# Patient Record
Sex: FEMALE | Race: WHITE | NOT HISPANIC OR LATINO
[De-identification: names, ages, dates, MRNs, and addresses within clinical notes are randomized per-mention and may not be internally consistent; named-entity substitution may affect disease eponyms.]

---

## 2017-02-01 ENCOUNTER — APPOINTMENT (OUTPATIENT)
Dept: INTERNAL MEDICINE | Facility: CLINIC | Age: 67
End: 2017-02-01

## 2017-02-01 VITALS
OXYGEN SATURATION: 98 % | DIASTOLIC BLOOD PRESSURE: 80 MMHG | HEIGHT: 61.5 IN | SYSTOLIC BLOOD PRESSURE: 146 MMHG | WEIGHT: 178 LBS | HEART RATE: 88 BPM | BODY MASS INDEX: 33.18 KG/M2

## 2017-02-01 VITALS — DIASTOLIC BLOOD PRESSURE: 88 MMHG | SYSTOLIC BLOOD PRESSURE: 140 MMHG

## 2017-02-01 DIAGNOSIS — R25.2 CRAMP AND SPASM: ICD-10-CM

## 2017-02-05 LAB
25(OH)D3 SERPL-MCNC: 45.6 NG/ML
ALBUMIN SERPL ELPH-MCNC: 4.7 G/DL
ALP BLD-CCNC: 53 U/L
ALT SERPL-CCNC: 31 U/L
ANION GAP SERPL CALC-SCNC: 17 MMOL/L
AST SERPL-CCNC: 26 U/L
BASOPHILS # BLD AUTO: 0.06 K/UL
BASOPHILS NFR BLD AUTO: 0.8 %
BILIRUB SERPL-MCNC: 0.8 MG/DL
BUN SERPL-MCNC: 13 MG/DL
CALCIUM SERPL-MCNC: 9.9 MG/DL
CHLORIDE SERPL-SCNC: 99 MMOL/L
CHOLEST SERPL-MCNC: 209 MG/DL
CHOLEST/HDLC SERPL: 2.1 RATIO
CO2 SERPL-SCNC: 25 MMOL/L
CREAT SERPL-MCNC: 0.76 MG/DL
EOSINOPHIL # BLD AUTO: 0.11 K/UL
EOSINOPHIL NFR BLD AUTO: 1.5 %
GLUCOSE SERPL-MCNC: 99 MG/DL
HBA1C MFR BLD HPLC: 6.1 %
HCT VFR BLD CALC: 45.4 %
HDLC SERPL-MCNC: 101 MG/DL
HGB BLD-MCNC: 14.6 G/DL
IMM GRANULOCYTES NFR BLD AUTO: 0.1 %
LDLC SERPL CALC-MCNC: 93 MG/DL
LYMPHOCYTES # BLD AUTO: 1.82 K/UL
LYMPHOCYTES NFR BLD AUTO: 25.6 %
MAGNESIUM SERPL-MCNC: 2 MG/DL
MAN DIFF?: NORMAL
MCHC RBC-ENTMCNC: 28.9 PG
MCHC RBC-ENTMCNC: 32.2 GM/DL
MCV RBC AUTO: 89.9 FL
MONOCYTES # BLD AUTO: 0.46 K/UL
MONOCYTES NFR BLD AUTO: 6.5 %
NEUTROPHILS # BLD AUTO: 4.66 K/UL
NEUTROPHILS NFR BLD AUTO: 65.5 %
PLATELET # BLD AUTO: 292 K/UL
POTASSIUM SERPL-SCNC: 4 MMOL/L
PROT SERPL-MCNC: 7.5 G/DL
RBC # BLD: 5.05 M/UL
RBC # FLD: 14.5 %
SODIUM SERPL-SCNC: 141 MMOL/L
TRIGL SERPL-MCNC: 74 MG/DL
TSH SERPL-ACNC: 3.44 UIU/ML
WBC # FLD AUTO: 7.12 K/UL

## 2017-03-17 ENCOUNTER — APPOINTMENT (OUTPATIENT)
Dept: ULTRASOUND IMAGING | Facility: IMAGING CENTER | Age: 67
End: 2017-03-17

## 2017-03-17 ENCOUNTER — OUTPATIENT (OUTPATIENT)
Dept: OUTPATIENT SERVICES | Facility: HOSPITAL | Age: 67
LOS: 1 days | End: 2017-03-17
Payer: COMMERCIAL

## 2017-03-17 DIAGNOSIS — Z00.8 ENCOUNTER FOR OTHER GENERAL EXAMINATION: ICD-10-CM

## 2017-03-17 DIAGNOSIS — Z98.89 OTHER SPECIFIED POSTPROCEDURAL STATES: Chronic | ICD-10-CM

## 2017-03-17 PROCEDURE — 76642 ULTRASOUND BREAST LIMITED: CPT

## 2017-06-29 ENCOUNTER — APPOINTMENT (OUTPATIENT)
Dept: OBGYN | Facility: CLINIC | Age: 67
End: 2017-06-29

## 2017-06-29 VITALS
HEIGHT: 61 IN | WEIGHT: 180 LBS | SYSTOLIC BLOOD PRESSURE: 148 MMHG | BODY MASS INDEX: 33.99 KG/M2 | HEART RATE: 84 BPM | DIASTOLIC BLOOD PRESSURE: 74 MMHG

## 2017-09-22 ENCOUNTER — APPOINTMENT (OUTPATIENT)
Dept: ULTRASOUND IMAGING | Facility: IMAGING CENTER | Age: 67
End: 2017-09-22
Payer: COMMERCIAL

## 2017-09-22 ENCOUNTER — APPOINTMENT (OUTPATIENT)
Dept: MAMMOGRAPHY | Facility: IMAGING CENTER | Age: 67
End: 2017-09-22
Payer: COMMERCIAL

## 2017-09-22 ENCOUNTER — OUTPATIENT (OUTPATIENT)
Dept: OUTPATIENT SERVICES | Facility: HOSPITAL | Age: 67
LOS: 1 days | End: 2017-09-22
Payer: COMMERCIAL

## 2017-09-22 ENCOUNTER — APPOINTMENT (OUTPATIENT)
Dept: ULTRASOUND IMAGING | Facility: IMAGING CENTER | Age: 67
End: 2017-09-22

## 2017-09-22 ENCOUNTER — APPOINTMENT (OUTPATIENT)
Dept: MAMMOGRAPHY | Facility: IMAGING CENTER | Age: 67
End: 2017-09-22

## 2017-09-22 DIAGNOSIS — Z98.89 OTHER SPECIFIED POSTPROCEDURAL STATES: Chronic | ICD-10-CM

## 2017-09-22 DIAGNOSIS — N95.2 POSTMENOPAUSAL ATROPHIC VAGINITIS: ICD-10-CM

## 2017-09-22 PROCEDURE — G0202: CPT | Mod: 26

## 2017-09-22 PROCEDURE — 76641 ULTRASOUND BREAST COMPLETE: CPT | Mod: 26,50

## 2017-09-22 PROCEDURE — 77063 BREAST TOMOSYNTHESIS BI: CPT | Mod: 26

## 2017-09-22 PROCEDURE — 76641 ULTRASOUND BREAST COMPLETE: CPT

## 2017-09-22 PROCEDURE — 77063 BREAST TOMOSYNTHESIS BI: CPT

## 2017-09-22 PROCEDURE — 77067 SCR MAMMO BI INCL CAD: CPT

## 2017-09-27 ENCOUNTER — TRANSCRIPTION ENCOUNTER (OUTPATIENT)
Age: 67
End: 2017-09-27

## 2017-12-22 ENCOUNTER — APPOINTMENT (OUTPATIENT)
Dept: GASTROENTEROLOGY | Facility: CLINIC | Age: 67
End: 2017-12-22
Payer: MEDICARE

## 2017-12-22 VITALS
BODY MASS INDEX: 34.39 KG/M2 | DIASTOLIC BLOOD PRESSURE: 80 MMHG | WEIGHT: 182 LBS | HEART RATE: 82 BPM | OXYGEN SATURATION: 98 % | SYSTOLIC BLOOD PRESSURE: 130 MMHG | TEMPERATURE: 98.2 F

## 2017-12-22 PROCEDURE — 99214 OFFICE O/P EST MOD 30 MIN: CPT

## 2018-01-25 ENCOUNTER — APPOINTMENT (OUTPATIENT)
Dept: INTERNAL MEDICINE | Facility: CLINIC | Age: 68
End: 2018-01-25
Payer: COMMERCIAL

## 2018-01-25 ENCOUNTER — OUTPATIENT (OUTPATIENT)
Dept: OUTPATIENT SERVICES | Facility: HOSPITAL | Age: 68
LOS: 1 days | Discharge: HOME | End: 2018-01-25
Payer: MEDICARE

## 2018-01-25 ENCOUNTER — RESULT REVIEW (OUTPATIENT)
Age: 68
End: 2018-01-25

## 2018-01-25 ENCOUNTER — APPOINTMENT (OUTPATIENT)
Dept: GASTROENTEROLOGY | Facility: HOSPITAL | Age: 68
End: 2018-01-25

## 2018-01-25 DIAGNOSIS — Z98.89 OTHER SPECIFIED POSTPROCEDURAL STATES: Chronic | ICD-10-CM

## 2018-01-25 DIAGNOSIS — K21.9 GASTRO-ESOPHAGEAL REFLUX DISEASE WITHOUT ESOPHAGITIS: ICD-10-CM

## 2018-01-25 PROCEDURE — G0008: CPT

## 2018-01-25 PROCEDURE — 90686 IIV4 VACC NO PRSV 0.5 ML IM: CPT

## 2018-01-25 PROCEDURE — 88305 TISSUE EXAM BY PATHOLOGIST: CPT | Mod: 26

## 2018-01-25 PROCEDURE — 88312 SPECIAL STAINS GROUP 1: CPT | Mod: 26

## 2018-01-25 PROCEDURE — 43239 EGD BIOPSY SINGLE/MULTIPLE: CPT

## 2018-01-26 LAB — SURGICAL PATHOLOGY STUDY: SIGNIFICANT CHANGE UP

## 2018-02-02 ENCOUNTER — APPOINTMENT (OUTPATIENT)
Dept: INTERNAL MEDICINE | Facility: CLINIC | Age: 68
End: 2018-02-02
Payer: MEDICARE

## 2018-02-02 ENCOUNTER — NON-APPOINTMENT (OUTPATIENT)
Age: 68
End: 2018-02-02

## 2018-02-02 VITALS
DIASTOLIC BLOOD PRESSURE: 80 MMHG | OXYGEN SATURATION: 98 % | BODY MASS INDEX: 33.99 KG/M2 | WEIGHT: 180 LBS | HEIGHT: 61 IN | SYSTOLIC BLOOD PRESSURE: 146 MMHG | HEART RATE: 79 BPM

## 2018-02-02 DIAGNOSIS — N60.19 DIFFUSE CYSTIC MASTOPATHY OF UNSPECIFIED BREAST: ICD-10-CM

## 2018-02-02 DIAGNOSIS — Z80.8 FAMILY HISTORY OF MALIGNANT NEOPLASM OF OTHER ORGANS OR SYSTEMS: ICD-10-CM

## 2018-02-02 DIAGNOSIS — J30.9 ALLERGIC RHINITIS, UNSPECIFIED: ICD-10-CM

## 2018-02-02 PROCEDURE — 99215 OFFICE O/P EST HI 40 MIN: CPT | Mod: 25

## 2018-02-02 PROCEDURE — G0009: CPT

## 2018-02-02 PROCEDURE — 90670 PCV13 VACCINE IM: CPT

## 2018-02-03 VITALS — DIASTOLIC BLOOD PRESSURE: 82 MMHG | SYSTOLIC BLOOD PRESSURE: 122 MMHG

## 2018-02-03 PROBLEM — J30.9 ALLERGIC RHINITIS: Status: ACTIVE | Noted: 2018-02-03

## 2018-02-03 PROBLEM — N60.19 FIBROCYSTIC BREAST: Status: ACTIVE | Noted: 2018-02-03

## 2018-02-03 PROBLEM — Z80.8 FAMILY HISTORY OF MALIGNANT MELANOMA: Status: ACTIVE | Noted: 2018-02-03

## 2018-02-04 LAB
25(OH)D3 SERPL-MCNC: 43.3 NG/ML
ALBUMIN SERPL ELPH-MCNC: 4.5 G/DL
ALP BLD-CCNC: 53 U/L
ALT SERPL-CCNC: 28 U/L
ANION GAP SERPL CALC-SCNC: 14 MMOL/L
AST SERPL-CCNC: 21 U/L
BASOPHILS # BLD AUTO: 0.04 K/UL
BASOPHILS NFR BLD AUTO: 0.6 %
BILIRUB SERPL-MCNC: 0.4 MG/DL
BUN SERPL-MCNC: 14 MG/DL
CALCIUM SERPL-MCNC: 9.9 MG/DL
CHLORIDE SERPL-SCNC: 100 MMOL/L
CHOLEST SERPL-MCNC: 205 MG/DL
CHOLEST/HDLC SERPL: 2.3 RATIO
CO2 SERPL-SCNC: 27 MMOL/L
CREAT SERPL-MCNC: 0.75 MG/DL
EOSINOPHIL # BLD AUTO: 0.09 K/UL
EOSINOPHIL NFR BLD AUTO: 1.3 %
GLUCOSE SERPL-MCNC: 107 MG/DL
HBA1C MFR BLD HPLC: 6 %
HBV SURFACE AB SER QL: NONREACTIVE
HBV SURFACE AG SER QL: NONREACTIVE
HCT VFR BLD CALC: 42.4 %
HCV AB SER QL: NONREACTIVE
HCV S/CO RATIO: 0.08 S/CO
HDLC SERPL-MCNC: 89 MG/DL
HGB BLD-MCNC: 13.7 G/DL
HIV1+2 AB SPEC QL IA.RAPID: NONREACTIVE
IMM GRANULOCYTES NFR BLD AUTO: 0.3 %
LDLC SERPL CALC-MCNC: 102 MG/DL
LYMPHOCYTES # BLD AUTO: 1.59 K/UL
LYMPHOCYTES NFR BLD AUTO: 23.7 %
MAN DIFF?: NORMAL
MCHC RBC-ENTMCNC: 28.4 PG
MCHC RBC-ENTMCNC: 32.3 GM/DL
MCV RBC AUTO: 87.8 FL
MONOCYTES # BLD AUTO: 0.53 K/UL
MONOCYTES NFR BLD AUTO: 7.9 %
NEUTROPHILS # BLD AUTO: 4.43 K/UL
NEUTROPHILS NFR BLD AUTO: 66.2 %
PLATELET # BLD AUTO: 284 K/UL
POTASSIUM SERPL-SCNC: 4.2 MMOL/L
PROT SERPL-MCNC: 7.2 G/DL
RBC # BLD: 4.83 M/UL
RBC # FLD: 14.7 %
SODIUM SERPL-SCNC: 141 MMOL/L
TRIGL SERPL-MCNC: 69 MG/DL
TSH SERPL-ACNC: 4.8 UIU/ML
WBC # FLD AUTO: 6.7 K/UL

## 2018-02-12 ENCOUNTER — MEDICATION RENEWAL (OUTPATIENT)
Age: 68
End: 2018-02-12

## 2018-02-16 ENCOUNTER — MEDICATION RENEWAL (OUTPATIENT)
Age: 68
End: 2018-02-16

## 2018-05-04 ENCOUNTER — APPOINTMENT (OUTPATIENT)
Dept: GASTROENTEROLOGY | Facility: CLINIC | Age: 68
End: 2018-05-04
Payer: MEDICARE

## 2018-05-04 VITALS
HEART RATE: 87 BPM | HEIGHT: 61 IN | DIASTOLIC BLOOD PRESSURE: 80 MMHG | BODY MASS INDEX: 33.61 KG/M2 | SYSTOLIC BLOOD PRESSURE: 124 MMHG | OXYGEN SATURATION: 98 % | WEIGHT: 178 LBS

## 2018-05-04 PROCEDURE — 99213 OFFICE O/P EST LOW 20 MIN: CPT

## 2018-05-10 ENCOUNTER — FORM ENCOUNTER (OUTPATIENT)
Age: 68
End: 2018-05-10

## 2018-05-10 ENCOUNTER — APPOINTMENT (OUTPATIENT)
Dept: INTERNAL MEDICINE | Facility: CLINIC | Age: 68
End: 2018-05-10
Payer: MEDICARE

## 2018-05-10 VITALS
SYSTOLIC BLOOD PRESSURE: 144 MMHG | BODY MASS INDEX: 33.79 KG/M2 | HEIGHT: 61 IN | HEART RATE: 88 BPM | OXYGEN SATURATION: 98 % | WEIGHT: 179 LBS | DIASTOLIC BLOOD PRESSURE: 76 MMHG

## 2018-05-10 DIAGNOSIS — M25.552 PAIN IN LEFT HIP: ICD-10-CM

## 2018-05-10 PROCEDURE — 99213 OFFICE O/P EST LOW 20 MIN: CPT

## 2018-05-11 ENCOUNTER — OUTPATIENT (OUTPATIENT)
Dept: OUTPATIENT SERVICES | Facility: HOSPITAL | Age: 68
LOS: 1 days | End: 2018-05-11
Payer: MEDICARE

## 2018-05-11 ENCOUNTER — APPOINTMENT (OUTPATIENT)
Dept: RADIOLOGY | Facility: IMAGING CENTER | Age: 68
End: 2018-05-11
Payer: MEDICARE

## 2018-05-11 DIAGNOSIS — M25.552 PAIN IN LEFT HIP: ICD-10-CM

## 2018-05-11 DIAGNOSIS — Z98.89 OTHER SPECIFIED POSTPROCEDURAL STATES: Chronic | ICD-10-CM

## 2018-05-11 PROCEDURE — 73502 X-RAY EXAM HIP UNI 2-3 VIEWS: CPT | Mod: 26,LT

## 2018-05-11 PROCEDURE — 73502 X-RAY EXAM HIP UNI 2-3 VIEWS: CPT

## 2018-05-23 ENCOUNTER — APPOINTMENT (OUTPATIENT)
Dept: ORTHOPEDIC SURGERY | Facility: CLINIC | Age: 68
End: 2018-05-23
Payer: MEDICARE

## 2018-05-23 VITALS
DIASTOLIC BLOOD PRESSURE: 78 MMHG | SYSTOLIC BLOOD PRESSURE: 175 MMHG | HEART RATE: 94 BPM | BODY MASS INDEX: 33.04 KG/M2 | WEIGHT: 175 LBS | HEIGHT: 61 IN

## 2018-05-23 DIAGNOSIS — M16.12 UNILATERAL PRIMARY OSTEOARTHRITIS, LEFT HIP: ICD-10-CM

## 2018-05-23 PROCEDURE — 99203 OFFICE O/P NEW LOW 30 MIN: CPT

## 2018-05-29 PROBLEM — M16.12 PRIMARY OSTEOARTHRITIS OF LEFT HIP: Status: ACTIVE | Noted: 2018-05-29

## 2018-06-07 ENCOUNTER — TRANSCRIPTION ENCOUNTER (OUTPATIENT)
Age: 68
End: 2018-06-07

## 2018-06-08 ENCOUNTER — TRANSCRIPTION ENCOUNTER (OUTPATIENT)
Age: 68
End: 2018-06-08

## 2018-06-11 ENCOUNTER — TRANSCRIPTION ENCOUNTER (OUTPATIENT)
Age: 68
End: 2018-06-11

## 2018-06-15 ENCOUNTER — APPOINTMENT (OUTPATIENT)
Dept: INTERNAL MEDICINE | Facility: CLINIC | Age: 68
End: 2018-06-15
Payer: MEDICARE

## 2018-06-15 VITALS
DIASTOLIC BLOOD PRESSURE: 78 MMHG | WEIGHT: 176 LBS | HEIGHT: 61 IN | HEART RATE: 90 BPM | OXYGEN SATURATION: 98 % | BODY MASS INDEX: 33.23 KG/M2 | SYSTOLIC BLOOD PRESSURE: 132 MMHG

## 2018-06-15 DIAGNOSIS — Z01.818 ENCOUNTER FOR OTHER PREPROCEDURAL EXAMINATION: ICD-10-CM

## 2018-06-15 PROCEDURE — 99213 OFFICE O/P EST LOW 20 MIN: CPT

## 2018-06-20 PROBLEM — Z01.818 PRE-OP EVALUATION: Status: ACTIVE | Noted: 2018-06-20

## 2018-06-20 NOTE — PHYSICAL EXAM
[No Acute Distress] : no acute distress [Well Nourished] : well nourished [Well Developed] : well developed [Well-Appearing] : well-appearing [Supple] : supple [No Respiratory Distress] : no respiratory distress  [Clear to Auscultation] : lungs were clear to auscultation bilaterally [No Accessory Muscle Use] : no accessory muscle use [Normal Rate] : normal rate  [Regular Rhythm] : with a regular rhythm [Normal S1, S2] : normal S1 and S2 [No Murmur] : no murmur heard [No Edema] : there was no peripheral edema

## 2018-06-20 NOTE — HISTORY OF PRESENT ILLNESS
[Coronary Artery Disease] : no coronary artery disease [Diabetes] : no diabetes [Sleep Apnea] : no sleep apnea [COPD] : no COPD [Previous Adverse Anesthesia Reaction] : no previous adverse anesthesia reaction [( Patient denies any chest pain, claudication, dyspnea on exertion, orthopnea, palpitations or syncope )] : Patient denies any chest pain, claudication, dyspnea on exertion, orthopnea, palpitations or syncope. [Good (7-10 METs)] : Good (7-10 METs) [FreeTextEntry1] : left hip replacement [FreeTextEntry2] : July 2nd  [FreeTextEntry3] : Dr. Jay Hogan ([place])  [FreeTextEntry4] : 67 F w/ obesity, pre-DM, fibrocystic breast disease, precancerous colonic polyp, GERD here for pre-op evaluation. \par \par Patient fell October 2017 with continued pain. No CP, palps, SOB or syncope. No APA or AC. Exercise tolerance - 2 flights. \par

## 2018-06-20 NOTE — REVIEW OF SYSTEMS
[Fever] : no fever [Chest Pain] : no chest pain [Palpitations] : no palpitations [Lower Ext Edema] : no lower extremity edema [Shortness Of Breath] : no shortness of breath [Cough] : no cough

## 2018-06-20 NOTE — RESULTS/DATA
[] : results reviewed [de-identified] : wnl [de-identified] : wnl inr, ptt  [de-identified] : wnl  [de-identified] : EKG 6/12/18 - SR @ 69, TWI V1-V2, \par UA - wnl

## 2018-06-20 NOTE — ASSESSMENT
[High Risk Surgery - Intraperitoneal, Intrathoracic or Supringuinal Vascular Procedures] : High Risk Surgery - Intraperitoneal, Intrathoracic or Supringuinal Vascular Procedures - No (0) [Ischemic Heart Disease] : Ischemic Heart Disease - No (0) [Congestive Heart Failure] : Congestive Heart Failure - No (0) [Prior Cerebrovascular Accident or TIA] : Prior Cerebrovascular Accident or TIA - No (0) [Creatinine >= 2mg/dL (1 Point)] : Creatinine >= 2mg/dL - No (0) [Insulin-dependent Diabetic (1 Point)] : Insulin-dependent Diabetic - No (0) [0] : 0 , RCRI Class: I, Risk of Post-Op Cardiac Complications: 0.4%, Procedure Risk: Low-Risk [FreeTextEntry4] : 67 F w/ obesity, pre-DM, fibrocystic breast disease, precancerous colonic polyp, GERD here for pre-op evaluation. \par \par No RCRI predictors of risk. Risk estimate 0.4% Good exercise tolerance.\par Patient is medically optimized for procedure.  \par \par Will fax to [phone]\par

## 2018-07-12 ENCOUNTER — APPOINTMENT (OUTPATIENT)
Dept: INTERNAL MEDICINE | Facility: CLINIC | Age: 68
End: 2018-07-12

## 2018-07-16 ENCOUNTER — TRANSCRIPTION ENCOUNTER (OUTPATIENT)
Age: 68
End: 2018-07-16

## 2018-09-05 ENCOUNTER — TRANSCRIPTION ENCOUNTER (OUTPATIENT)
Age: 68
End: 2018-09-05

## 2018-09-05 ENCOUNTER — MEDICATION RENEWAL (OUTPATIENT)
Age: 68
End: 2018-09-05

## 2018-09-25 ENCOUNTER — FORM ENCOUNTER (OUTPATIENT)
Age: 68
End: 2018-09-25

## 2018-09-26 ENCOUNTER — APPOINTMENT (OUTPATIENT)
Dept: ULTRASOUND IMAGING | Facility: IMAGING CENTER | Age: 68
End: 2018-09-26
Payer: MEDICARE

## 2018-09-26 ENCOUNTER — APPOINTMENT (OUTPATIENT)
Dept: MAMMOGRAPHY | Facility: IMAGING CENTER | Age: 68
End: 2018-09-26
Payer: MEDICARE

## 2018-09-26 ENCOUNTER — OUTPATIENT (OUTPATIENT)
Dept: OUTPATIENT SERVICES | Facility: HOSPITAL | Age: 68
LOS: 1 days | End: 2018-09-26
Payer: MEDICARE

## 2018-09-26 DIAGNOSIS — R92.2 INCONCLUSIVE MAMMOGRAM: ICD-10-CM

## 2018-09-26 DIAGNOSIS — Z98.89 OTHER SPECIFIED POSTPROCEDURAL STATES: Chronic | ICD-10-CM

## 2018-09-26 PROCEDURE — 77066 DX MAMMO INCL CAD BI: CPT

## 2018-09-26 PROCEDURE — 77066 DX MAMMO INCL CAD BI: CPT | Mod: 26

## 2018-09-26 PROCEDURE — G0279: CPT

## 2018-09-26 PROCEDURE — G0279: CPT | Mod: 26

## 2018-09-26 PROCEDURE — 76641 ULTRASOUND BREAST COMPLETE: CPT | Mod: 26,50

## 2018-09-26 PROCEDURE — 76641 ULTRASOUND BREAST COMPLETE: CPT

## 2018-11-07 ENCOUNTER — APPOINTMENT (OUTPATIENT)
Dept: GASTROENTEROLOGY | Facility: CLINIC | Age: 68
End: 2018-11-07
Payer: MEDICARE

## 2018-11-07 VITALS
HEART RATE: 89 BPM | DIASTOLIC BLOOD PRESSURE: 80 MMHG | OXYGEN SATURATION: 98 % | TEMPERATURE: 98 F | SYSTOLIC BLOOD PRESSURE: 124 MMHG | BODY MASS INDEX: 33.23 KG/M2 | HEIGHT: 61 IN | WEIGHT: 176 LBS | RESPIRATION RATE: 16 BRPM

## 2018-11-07 PROCEDURE — 99214 OFFICE O/P EST MOD 30 MIN: CPT

## 2018-11-14 ENCOUNTER — APPOINTMENT (OUTPATIENT)
Dept: INTERNAL MEDICINE | Facility: CLINIC | Age: 68
End: 2018-11-14
Payer: MEDICARE

## 2018-11-14 VITALS — DIASTOLIC BLOOD PRESSURE: 80 MMHG | SYSTOLIC BLOOD PRESSURE: 122 MMHG

## 2018-11-14 PROCEDURE — G0008: CPT

## 2018-11-14 PROCEDURE — 90662 IIV NO PRSV INCREASED AG IM: CPT

## 2018-11-14 PROCEDURE — 99214 OFFICE O/P EST MOD 30 MIN: CPT | Mod: 25

## 2018-11-14 NOTE — HISTORY OF PRESENT ILLNESS
[FreeTextEntry1] : f/u [de-identified] : 69 yo woman w obesity, preDM, fibro breasts, colo polyp, gerd./esophagitis\par \par s/p  left hip repl - Dr. Jay Hogan at [facility] and doing very well, including walking and salsa dancing without pain.\par She thinks had congen hip issue and then the fall exacerb it, her right hip is fine.\par \par flu shot: will do today\par Mammo : had 9/18\par \par Saw GI, will have EGD/[place] next yr, will contin on Pantopr, EGD w esophagitis last yr

## 2018-11-14 NOTE — ASSESSMENT
[FreeTextEntry1] : Pt is doing well.\par Encourage wt loss.\par check labs.\par Will need BMD next yr, last was '14 = nl but longterm PPI as of next yr\par contin current regimen.\par \par

## 2018-11-20 LAB
ALBUMIN SERPL ELPH-MCNC: 4.6 G/DL
ALP BLD-CCNC: 60 U/L
ALT SERPL-CCNC: 36 U/L
ANION GAP SERPL CALC-SCNC: 12 MMOL/L
AST SERPL-CCNC: 27 U/L
BASOPHILS # BLD AUTO: 0.05 K/UL
BASOPHILS NFR BLD AUTO: 0.8 %
BILIRUB SERPL-MCNC: 0.8 MG/DL
BUN SERPL-MCNC: 14 MG/DL
CALCIUM SERPL-MCNC: 9.9 MG/DL
CHLORIDE SERPL-SCNC: 100 MMOL/L
CHOLEST SERPL-MCNC: 229 MG/DL
CHOLEST/HDLC SERPL: 2.3 RATIO
CO2 SERPL-SCNC: 27 MMOL/L
CREAT SERPL-MCNC: 0.76 MG/DL
EOSINOPHIL # BLD AUTO: 0.13 K/UL
EOSINOPHIL NFR BLD AUTO: 2.2 %
GLUCOSE SERPL-MCNC: 99 MG/DL
HBA1C MFR BLD HPLC: 6.1 %
HCT VFR BLD CALC: 44.9 %
HDLC SERPL-MCNC: 99 MG/DL
HGB BLD-MCNC: 15.1 G/DL
IMM GRANULOCYTES NFR BLD AUTO: 0.2 %
LDLC SERPL CALC-MCNC: 116 MG/DL
LYMPHOCYTES # BLD AUTO: 1.76 K/UL
LYMPHOCYTES NFR BLD AUTO: 29.5 %
MAGNESIUM SERPL-MCNC: 2.2 MG/DL
MAN DIFF?: NORMAL
MCHC RBC-ENTMCNC: 29.1 PG
MCHC RBC-ENTMCNC: 33.6 GM/DL
MCV RBC AUTO: 86.5 FL
MONOCYTES # BLD AUTO: 0.46 K/UL
MONOCYTES NFR BLD AUTO: 7.7 %
NEUTROPHILS # BLD AUTO: 3.56 K/UL
NEUTROPHILS NFR BLD AUTO: 59.6 %
PLATELET # BLD AUTO: 276 K/UL
POTASSIUM SERPL-SCNC: 4.2 MMOL/L
PROT SERPL-MCNC: 7.6 G/DL
RBC # BLD: 5.19 M/UL
RBC # FLD: 15.8 %
SODIUM SERPL-SCNC: 139 MMOL/L
TRIGL SERPL-MCNC: 72 MG/DL
TSH SERPL-ACNC: 3.86 UIU/ML
VIT B12 SERPL-MCNC: 1286 PG/ML
WBC # FLD AUTO: 5.97 K/UL

## 2019-03-05 ENCOUNTER — TRANSCRIPTION ENCOUNTER (OUTPATIENT)
Age: 69
End: 2019-03-05

## 2019-03-08 ENCOUNTER — APPOINTMENT (OUTPATIENT)
Dept: INTERNAL MEDICINE | Facility: CLINIC | Age: 69
End: 2019-03-08
Payer: MEDICARE

## 2019-03-08 VITALS
WEIGHT: 184 LBS | BODY MASS INDEX: 34.74 KG/M2 | HEIGHT: 61 IN | OXYGEN SATURATION: 98 % | DIASTOLIC BLOOD PRESSURE: 80 MMHG | SYSTOLIC BLOOD PRESSURE: 148 MMHG | HEART RATE: 96 BPM | TEMPERATURE: 98.4 F

## 2019-03-08 VITALS — SYSTOLIC BLOOD PRESSURE: 140 MMHG | DIASTOLIC BLOOD PRESSURE: 80 MMHG

## 2019-03-08 PROCEDURE — 90732 PPSV23 VACC 2 YRS+ SUBQ/IM: CPT

## 2019-03-08 PROCEDURE — G0444 DEPRESSION SCREEN ANNUAL: CPT | Mod: 59

## 2019-03-08 PROCEDURE — G0439: CPT

## 2019-03-08 PROCEDURE — G0009: CPT

## 2019-03-08 PROCEDURE — G0447 BEHAVIOR COUNSEL OBESITY 15M: CPT | Mod: 59

## 2019-03-08 NOTE — COUNSELING
[Weight management counseling provided] : Weight management [Healthy eating counseling provided] : healthy eating [Activity counseling provided] : activity [Needs reinforcement, provided] : Patient needs reinforcement on understanding of disease, goals and obesity follow-up plan; reinforcement was provided [None] : None [ - Behavioral Counseling for Obesity (Face-to-Face for 15 Minutes)] : Behavioral Counseling for Obesity (Face-to-Face for 15 Minutes) [ - Annual Depression Screening] : Annual Depression Screening

## 2019-03-08 NOTE — HEALTH RISK ASSESSMENT
[HIV test declined] : HIV test declined [Hepatitis C test declined] : Hepatitis C test declined [None] : None [Alone] : lives alone [] :  [Name: ___] : Health Care Proxy's Name: [unfilled]  [Very Good] : ~his/her~  mood as very good [No falls in past year] : Patient reported no falls in the past year [0] : 2) Feeling down, depressed, or hopeless: Not at all (0) [Patient reported mammogram was normal] : Patient reported mammogram was normal [Patient reported PAP Smear was normal] : Patient reported PAP Smear was normal [Patient reported colonoscopy was normal] : Patient reported colonoscopy was normal [Retired] : retired [Graduate School] : graduate school [Fully functional (bathing, dressing, toileting, transferring, walking, feeding)] : Fully functional (bathing, dressing, toileting, transferring, walking, feeding) [Fully functional (using the telephone, shopping, preparing meals, housekeeping, doing laundry, using] : Fully functional and needs no help or supervision to perform IADLs (using the telephone, shopping, preparing meals, housekeeping, doing laundry, using transportation, managing medications and managing finances) [Smoke Detector] : smoke detector [Carbon Monoxide Detector] : carbon monoxide detector [Safety elements used in home] : safety elements used in home [Seat Belt] :  uses seat belt [Sunscreen] : uses sunscreen [Relationship: ___] : Relationship: [unfilled] [] : No [de-identified] : dancing, walking [de-identified] : healthy [Change in mental status noted] : No change in mental status noted [Language] : denies difficulty with language [Reports changes in hearing] : Reports no changes in hearing [Reports changes in vision] : Reports no changes in vision [Reports changes in dental health] : Reports no changes in dental health [Guns at Home] : no guns at home [Travel to Developing Areas] : does not  travel to developing areas [Caregiver Concerns] : does not have caregiver concerns [MammogramDate] : 09/18 [PapSmearDate] : 09/18 [ColonoscopyDate] : 05/14 [ColonoscopyComments] : due [FreeTextEntry4] : will decide

## 2019-03-08 NOTE — ASSESSMENT
[FreeTextEntry1] : Pt w obesity, prDM, fibrocystic breasts, gerd, colo polyps,  hip replacemt, doing well.\par Elev BP today- ate salty meal last night- low salt discussed, she will monitor at home and let me know, bring cuff to next visit.\par \par check routine labs.\par Pneumovax 23 given\par \par Mammo/US/BMD\par Seeing GI for EGD/[place] in May.\par \par .

## 2019-03-08 NOTE — HISTORY OF PRESENT ILLNESS
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 Suero, having EGD/[place] in May; still on Pantoprazole, hasn't been able to come off.\par Mammo/US 9/18\par BMD:due\par Vax: due for Pneumo 23\par interested in shingrix\par Has gained some wt, no progress w obesity managmt.

## 2019-03-15 LAB
ALBUMIN SERPL ELPH-MCNC: 4.9 G/DL
ALP BLD-CCNC: 71 U/L
ALT SERPL-CCNC: 36 U/L
ANION GAP SERPL CALC-SCNC: 14 MMOL/L
AST SERPL-CCNC: 25 U/L
BASOPHILS # BLD AUTO: 0.05 K/UL
BASOPHILS NFR BLD AUTO: 0.8 %
BILIRUB SERPL-MCNC: 0.6 MG/DL
BUN SERPL-MCNC: 14 MG/DL
CALCIUM SERPL-MCNC: 9.8 MG/DL
CHLORIDE SERPL-SCNC: 102 MMOL/L
CHOLEST SERPL-MCNC: 205 MG/DL
CHOLEST/HDLC SERPL: 2.2 RATIO
CO2 SERPL-SCNC: 26 MMOL/L
CREAT SERPL-MCNC: 0.64 MG/DL
EOSINOPHIL # BLD AUTO: 0.17 K/UL
EOSINOPHIL NFR BLD AUTO: 2.6 %
GLUCOSE SERPL-MCNC: 109 MG/DL
HBA1C MFR BLD HPLC: 6.1 %
HCT VFR BLD CALC: 45.5 %
HDLC SERPL-MCNC: 95 MG/DL
HGB BLD-MCNC: 14.4 G/DL
IMM GRANULOCYTES NFR BLD AUTO: 0.2 %
LDLC SERPL CALC-MCNC: 92 MG/DL
LYMPHOCYTES # BLD AUTO: 1.44 K/UL
LYMPHOCYTES NFR BLD AUTO: 22.3 %
MAN DIFF?: NORMAL
MCHC RBC-ENTMCNC: 28.5 PG
MCHC RBC-ENTMCNC: 31.6 GM/DL
MCV RBC AUTO: 90.1 FL
MONOCYTES # BLD AUTO: 0.64 K/UL
MONOCYTES NFR BLD AUTO: 9.9 %
NEUTROPHILS # BLD AUTO: 4.15 K/UL
NEUTROPHILS NFR BLD AUTO: 64.2 %
PLATELET # BLD AUTO: 287 K/UL
POTASSIUM SERPL-SCNC: 4.2 MMOL/L
PROT SERPL-MCNC: 7 G/DL
RBC # BLD: 5.05 M/UL
RBC # FLD: 14.2 %
SODIUM SERPL-SCNC: 142 MMOL/L
TRIGL SERPL-MCNC: 90 MG/DL
TSH SERPL-ACNC: 6.25 UIU/ML
WBC # FLD AUTO: 6.46 K/UL

## 2019-03-20 ENCOUNTER — APPOINTMENT (OUTPATIENT)
Dept: OBGYN | Facility: CLINIC | Age: 69
End: 2019-03-20
Payer: MEDICARE

## 2019-03-20 VITALS
SYSTOLIC BLOOD PRESSURE: 172 MMHG | DIASTOLIC BLOOD PRESSURE: 93 MMHG | BODY MASS INDEX: 34.93 KG/M2 | HEIGHT: 61 IN | HEART RATE: 89 BPM | WEIGHT: 185 LBS

## 2019-03-20 DIAGNOSIS — N95.2 POSTMENOPAUSAL ATROPHIC VAGINITIS: ICD-10-CM

## 2019-03-20 PROCEDURE — G0101: CPT

## 2019-03-20 NOTE — PHYSICAL EXAM
[Awake] : awake [Alert] : alert [Soft] : soft [Oriented x3] : oriented to person, place, and time [Vulvar Atrophy] : vulvar atrophy [Normal] : cervix [Atrophy] : atrophy [Uterine Adnexae] : were not tender and not enlarged [No Bleeding] : there was no active vaginal bleeding [Acute Distress] : no acute distress [Mass] : no breast mass [Nipple Discharge] : no nipple discharge [Axillary LAD] : no axillary lymphadenopathy [Tender] : non tender

## 2019-03-25 ENCOUNTER — TRANSCRIPTION ENCOUNTER (OUTPATIENT)
Age: 69
End: 2019-03-25

## 2019-03-28 ENCOUNTER — TRANSCRIPTION ENCOUNTER (OUTPATIENT)
Age: 69
End: 2019-03-28

## 2019-05-15 ENCOUNTER — APPOINTMENT (OUTPATIENT)
Dept: GASTROENTEROLOGY | Facility: CLINIC | Age: 69
End: 2019-05-15
Payer: MEDICARE

## 2019-05-15 VITALS
BODY MASS INDEX: 34.17 KG/M2 | OXYGEN SATURATION: 97 % | WEIGHT: 181 LBS | DIASTOLIC BLOOD PRESSURE: 92 MMHG | SYSTOLIC BLOOD PRESSURE: 168 MMHG | RESPIRATION RATE: 16 BRPM | HEIGHT: 61 IN | HEART RATE: 83 BPM | TEMPERATURE: 98.1 F

## 2019-05-15 VITALS — SYSTOLIC BLOOD PRESSURE: 148 MMHG | DIASTOLIC BLOOD PRESSURE: 80 MMHG

## 2019-05-15 DIAGNOSIS — Z12.11 ENCOUNTER FOR SCREENING FOR MALIGNANT NEOPLASM OF COLON: ICD-10-CM

## 2019-05-15 PROCEDURE — 99214 OFFICE O/P EST MOD 30 MIN: CPT

## 2019-05-16 ENCOUNTER — TRANSCRIPTION ENCOUNTER (OUTPATIENT)
Age: 69
End: 2019-05-16

## 2019-05-16 NOTE — ASSESSMENT
[FreeTextEntry1] : Impression:\par \par #1 GERD- EGD on 1/25/18 noted for 3 cm hiatus hernia association with LA grade C erosive esophagitis. Davila's esophagus not detected. Patient has derived an excellent symptomatic response to current regimen of pantoprazole 40 mg daily with resolution of heartburn and dysphagia. Asymptomatic at the current time.\par \par #2 postnasal drip-chronic cough. Likely separate unrelated problem although GERD may be exacerbating these ENT symptoms. Resolved at the current time.\par \par #3 history of colonic adenomatous polyps-status post removal of a 1 cm sessile rectosigmoid junction polyp with high-grade dysplasia in 2004 and status post removal of a 3 mm hepatic flexure adenoma, 4 mm proximal transverse colon hyperplastic polyp and 3 mm distal sigmoid adenoma at the 12/24/14 colonoscopy.\par \par #4 history of infectious mononucleosis hepatitis in 2004.\par \par #5 obesity- BMI 34.2.

## 2019-05-16 NOTE — PHYSICAL EXAM
[General Appearance - In No Acute Distress] : in no acute distress [General Appearance - Alert] : alert [General Appearance - Well Developed] : well developed [General Appearance - Well-Appearing] : healthy appearing [Sclera] : the sclera and conjunctiva were normal [Oropharynx] : the oropharynx was normal [Neck Appearance] : the appearance of the neck was normal [Neck Cervical Mass (___cm)] : no neck mass was observed [Respiration, Rhythm And Depth] : normal respiratory rhythm and effort [Exaggerated Use Of Accessory Muscles For Inspiration] : no accessory muscle use [Auscultation Breath Sounds / Voice Sounds] : lungs were clear to auscultation bilaterally [Heart Rate And Rhythm] : heart rate was normal and rhythm regular [Heart Sounds Gallop] : no gallops [Heart Sounds] : normal S1 and S2 [Heart Sounds Pericardial Friction Rub] : no pericardial rub [Murmurs] : no murmurs [Edema] : there was no peripheral edema [Abdomen Tenderness] : non-tender [Abdomen Soft] : soft [] : no hepato-splenomegaly [Abdomen Hernia] : no hernia was discovered [Abdomen Mass (___ Cm)] : no abdominal mass palpated [Cervical Lymph Nodes Enlarged Anterior Bilaterally] : anterior cervical [Supraclavicular Lymph Nodes Enlarged Bilaterally] : supraclavicular [Cervical Lymph Nodes Enlarged Posterior Bilaterally] : posterior cervical [Nail Clubbing] : no clubbing  or cyanosis of the fingernails [Abnormal Walk] : normal gait [Skin Color & Pigmentation] : normal skin color and pigmentation [Oriented To Time, Place, And Person] : oriented to person, place, and time [Impaired Insight] : insight and judgment were intact [Affect] : the affect was normal [Mood] : the mood was normal [FreeTextEntry1] : The abdomen is mildly obese, soft, nontender, nondistended and without mass or hepatosplenomegaly.

## 2019-05-16 NOTE — CONSULT LETTER
[Please see my note below.] : Please see my note below. [Dear  ___] : Dear  [unfilled], [Consult Closing:] : Thank you very much for allowing me to participate in the care of this patient.  If you have any questions, please do not hesitate to contact me. [Sincerely,] : Sincerely, [Zak Suero M.D.] : Zak Suero M.D. [[address] : [address] [[facility]] : [facility] [Division of Gastroenterology] : Division of Gastroenterology [[place], [place] [address]] : [place] [address] [FreeTextEntry2] : Dr. Elinor Barahona [FreeTextEntry3] : Zak Suero M.D.

## 2019-05-16 NOTE — HISTORY OF PRESENT ILLNESS
[FreeTextEntry1] : Office revisit on 5/15/19.\par \par Patient presents for followup regarding treatment of  GERD.\par \par The patient was previously evaluated for this consultation on 10/29/14.\par \par INITIAL CONSULTATION NOTE:\par \par Office consultation on 10/29/14. The patient is a 64-year-old woman who is being evaluated for a surveillance colonoscopy\par \par The patient is currently asymptomatic from a gastrointestinal standpoint. She has one formed bowel movement daily without any complaints of diarrhea, constipation, change in bowel habit or rectal bleeding. Her appetite and weight are stable. She reports no complaints of dysphagia, heartburn, nausea, vomiting or abdominal pain.\par \par The patient previously experienced irregular bowel movements. She could have a bowel movement every day but then may not move her bowels for 2 or 3 days. However, since starting a regimen of yogurt with either Greek yogurt or Activia she is having regular daily bowel movements without any irregularity or difficulty.\par \par In 2004 the patient underwent a colonoscopy at which time a 1cm sessile polyp at the rectosigmoid junction with high-grade dysplasia was removed. The patient underwent colonoscopy examinations in 2005, 2006 and 2008 at which time there were no neoplastic polyps removed. A few hyperplastic polyps were removed.\par \par The patient's last colonoscopy was on 4/27/10 at which time melanosis coli was noted. Polyps were not detected at that examination.\par \par COLONOSCOPY 12/31/14:    -The patient underwent a surveillance colonoscopy on 12/22/14. A total colonoscopy was performed to the cecum with ileoscopy. A 3 mm hepatic flexure adenoma, 4 mm proximal transverse colon hyperplastic polyp and a 3 mm distal sigmoid adenoma were removed. A followup surveillance colonoscopy is advised in 5 years. \par \par CURRENT HISTORY:\par \par ORV 12/22/17:    -Patient presented with complaint of heartburn of one year duration. Approximately one-year symptoms but with increase in severity over the past few months. Heartburn described as retrosternal burning. Mostly daytime symptoms and triggered by oral intake such as water and yogurt. Paradoxically fried and fatty food is less provocative. Experiences approximately 3-4 episodes per week. Gets some relief with Zantac 150 mg. Experiences sense of dysphagia with slow bolus passage both to liquids and solids. This is mild and intermittent.\par             -Appetite and weight are stable. Patient has one formed bowel movement daily without any complaints of diarrhea, constipation, change in bowel habit or rectal bleeding.\par               -Patient complains of postnasal drip with associated throat eructation and cough. Denies dyspnea, wheezing, chest pain, sinus congestion, hoarseness or focal change. Patient indicates that postnasal drip and cough and sometimes provoke nonbloody vomiting.\par \par EGD 1/25/18:    -EGD was performed on 1/25/18 to evaluate GERD symptoms and dysphagia. Examination of the esophagus revealed a 3 cm hiatus hernia in association with LA grade C erosive esophagitis. Distal esophagus biopsy revealed active esophagitis with ulcer consistent with reflux esophagitis. Dysplasia or any evidence of fungi or viral cytopathic change was not detected. Examination of the stomach was normal. Gastric antrum biopsy revealed a nonspecific reactive gastropathy. Testing was negative for Helicobacter pylori. The visualized duodenum to the second portion was normal.\par \par ORV 5/4/18:    -Patient presents for followup regarding recent diagnosis of erosive esophagitis. Currently doing very well on a regimen of pantoprazole 40 mg daily. No complaints of breakthrough heartburn, regurgitation or dysphagia.  Denies nausea, vomiting, abdominal pain or other GI symptoms.\par \par ORV 11/7/18:    -Patient presents for followup of history of GERD. Reports doing very well on current regimen of maintenance pantoprazole 40 mg q.d. No complaint of any breakthrough heartburn, regurgitation, nausea or vomiting. Appetite and weight are stable. Denies any complaints of abdominal pain, diarrhea, constipation, change in bowel habit or rectal bleeding.\par            -Patient underwent left THR 7/2018.\par \par ORV 5/15/19:    -Patient presents for followup regarding history of GERD. In addition, planned for surveillance colonoscopy. Currently doing very well on maintenance pantoprazole 40 mg q.d. Appetite and weight are stable. Denies any complaints of breakthrough heartburn, regurgitation or other reflux symptoms. Denies dysphagia, nausea, vomiting, abdominal pain, diarrhea, constipation, change in bowel habit or rectal bleeding.

## 2019-05-20 ENCOUNTER — TRANSCRIPTION ENCOUNTER (OUTPATIENT)
Age: 69
End: 2019-05-20

## 2019-09-05 ENCOUNTER — TRANSCRIPTION ENCOUNTER (OUTPATIENT)
Age: 69
End: 2019-09-05

## 2019-09-05 ENCOUNTER — MEDICATION RENEWAL (OUTPATIENT)
Age: 69
End: 2019-09-05

## 2019-09-26 ENCOUNTER — FORM ENCOUNTER (OUTPATIENT)
Age: 69
End: 2019-09-26

## 2019-09-27 ENCOUNTER — APPOINTMENT (OUTPATIENT)
Dept: ULTRASOUND IMAGING | Facility: IMAGING CENTER | Age: 69
End: 2019-09-27
Payer: MEDICARE

## 2019-09-27 ENCOUNTER — APPOINTMENT (OUTPATIENT)
Dept: RADIOLOGY | Facility: IMAGING CENTER | Age: 69
End: 2019-09-27
Payer: MEDICARE

## 2019-09-27 ENCOUNTER — APPOINTMENT (OUTPATIENT)
Dept: MAMMOGRAPHY | Facility: IMAGING CENTER | Age: 69
End: 2019-09-27
Payer: MEDICARE

## 2019-09-27 ENCOUNTER — OUTPATIENT (OUTPATIENT)
Dept: OUTPATIENT SERVICES | Facility: HOSPITAL | Age: 69
LOS: 1 days | End: 2019-09-27
Payer: MEDICARE

## 2019-09-27 DIAGNOSIS — M81.0 AGE-RELATED OSTEOPOROSIS WITHOUT CURRENT PATHOLOGICAL FRACTURE: ICD-10-CM

## 2019-09-27 DIAGNOSIS — Z98.89 OTHER SPECIFIED POSTPROCEDURAL STATES: Chronic | ICD-10-CM

## 2019-09-27 DIAGNOSIS — R92.8 OTHER ABNORMAL AND INCONCLUSIVE FINDINGS ON DIAGNOSTIC IMAGING OF BREAST: ICD-10-CM

## 2019-09-27 PROCEDURE — 77080 DXA BONE DENSITY AXIAL: CPT | Mod: 26

## 2019-09-27 PROCEDURE — 77067 SCR MAMMO BI INCL CAD: CPT | Mod: 26,59

## 2019-09-27 PROCEDURE — 77063 BREAST TOMOSYNTHESIS BI: CPT | Mod: 26,59

## 2019-09-27 PROCEDURE — 77067 SCR MAMMO BI INCL CAD: CPT

## 2019-09-27 PROCEDURE — 76641 ULTRASOUND BREAST COMPLETE: CPT

## 2019-09-27 PROCEDURE — 77065 DX MAMMO INCL CAD UNI: CPT

## 2019-09-27 PROCEDURE — 76641 ULTRASOUND BREAST COMPLETE: CPT | Mod: 26,50

## 2019-09-27 PROCEDURE — 77065 DX MAMMO INCL CAD UNI: CPT | Mod: 26,GG,LT

## 2019-09-27 PROCEDURE — 77063 BREAST TOMOSYNTHESIS BI: CPT

## 2019-09-27 PROCEDURE — 77080 DXA BONE DENSITY AXIAL: CPT

## 2019-09-30 ENCOUNTER — FORM ENCOUNTER (OUTPATIENT)
Age: 69
End: 2019-09-30

## 2019-10-01 ENCOUNTER — APPOINTMENT (OUTPATIENT)
Dept: ULTRASOUND IMAGING | Facility: CLINIC | Age: 69
End: 2019-10-01
Payer: MEDICARE

## 2019-10-01 ENCOUNTER — OUTPATIENT (OUTPATIENT)
Dept: OUTPATIENT SERVICES | Facility: HOSPITAL | Age: 69
LOS: 1 days | End: 2019-10-01
Payer: MEDICARE

## 2019-10-01 ENCOUNTER — RESULT REVIEW (OUTPATIENT)
Age: 69
End: 2019-10-01

## 2019-10-01 ENCOUNTER — APPOINTMENT (OUTPATIENT)
Dept: MAMMOGRAPHY | Facility: CLINIC | Age: 69
End: 2019-10-01
Payer: MEDICARE

## 2019-10-01 DIAGNOSIS — Z98.89 OTHER SPECIFIED POSTPROCEDURAL STATES: Chronic | ICD-10-CM

## 2019-10-01 DIAGNOSIS — R92.8 OTHER ABNORMAL AND INCONCLUSIVE FINDINGS ON DIAGNOSTIC IMAGING OF BREAST: ICD-10-CM

## 2019-10-01 PROCEDURE — 77065 DX MAMMO INCL CAD UNI: CPT

## 2019-10-01 PROCEDURE — 19083 BX BREAST 1ST LESION US IMAG: CPT | Mod: LT

## 2019-10-01 PROCEDURE — A4648: CPT

## 2019-10-01 PROCEDURE — 19081 BX BREAST 1ST LESION STRTCTC: CPT | Mod: LT

## 2019-10-01 PROCEDURE — 19081 BX BREAST 1ST LESION STRTCTC: CPT

## 2019-10-01 PROCEDURE — 88305 TISSUE EXAM BY PATHOLOGIST: CPT | Mod: 26

## 2019-10-01 PROCEDURE — 19083 BX BREAST 1ST LESION US IMAG: CPT

## 2019-10-01 PROCEDURE — 77065 DX MAMMO INCL CAD UNI: CPT | Mod: 26,LT

## 2019-10-01 PROCEDURE — 88305 TISSUE EXAM BY PATHOLOGIST: CPT

## 2019-10-02 LAB — SURGICAL PATHOLOGY STUDY: SIGNIFICANT CHANGE UP

## 2019-11-18 ENCOUNTER — TRANSCRIPTION ENCOUNTER (OUTPATIENT)
Age: 69
End: 2019-11-18

## 2019-11-20 ENCOUNTER — TRANSCRIPTION ENCOUNTER (OUTPATIENT)
Age: 69
End: 2019-11-20

## 2019-11-21 ENCOUNTER — OUTPATIENT (OUTPATIENT)
Dept: OUTPATIENT SERVICES | Facility: HOSPITAL | Age: 69
LOS: 1 days | Discharge: HOME | End: 2019-11-21
Payer: MEDICARE

## 2019-11-21 ENCOUNTER — RESULT REVIEW (OUTPATIENT)
Age: 69
End: 2019-11-21

## 2019-11-21 ENCOUNTER — APPOINTMENT (OUTPATIENT)
Dept: GASTROENTEROLOGY | Facility: HOSPITAL | Age: 69
End: 2019-11-21

## 2019-11-21 VITALS
WEIGHT: 181 LBS | TEMPERATURE: 98 F | HEIGHT: 61.5 IN | RESPIRATION RATE: 19 BRPM | HEART RATE: 86 BPM | OXYGEN SATURATION: 95 % | SYSTOLIC BLOOD PRESSURE: 146 MMHG | DIASTOLIC BLOOD PRESSURE: 72 MMHG

## 2019-11-21 VITALS
OXYGEN SATURATION: 98 % | HEART RATE: 63 BPM | SYSTOLIC BLOOD PRESSURE: 117 MMHG | RESPIRATION RATE: 19 BRPM | DIASTOLIC BLOOD PRESSURE: 66 MMHG

## 2019-11-21 DIAGNOSIS — Z96.642 PRESENCE OF LEFT ARTIFICIAL HIP JOINT: Chronic | ICD-10-CM

## 2019-11-21 DIAGNOSIS — Z98.89 OTHER SPECIFIED POSTPROCEDURAL STATES: Chronic | ICD-10-CM

## 2019-11-21 DIAGNOSIS — K21.9 GASTRO-ESOPHAGEAL REFLUX DISEASE WITHOUT ESOPHAGITIS: ICD-10-CM

## 2019-11-21 PROCEDURE — 45380 COLONOSCOPY AND BIOPSY: CPT

## 2019-11-21 PROCEDURE — 88305 TISSUE EXAM BY PATHOLOGIST: CPT | Mod: 26

## 2019-11-21 PROCEDURE — 43239 EGD BIOPSY SINGLE/MULTIPLE: CPT

## 2019-11-21 PROCEDURE — 88312 SPECIAL STAINS GROUP 1: CPT | Mod: 26

## 2019-11-21 RX ORDER — SODIUM CHLORIDE 9 MG/ML
1000 INJECTION, SOLUTION INTRAVENOUS
Refills: 0 | Status: DISCONTINUED | OUTPATIENT
Start: 2019-11-21 | End: 2019-12-17

## 2019-11-21 RX ORDER — CEFAZOLIN SODIUM 1 G
2000 VIAL (EA) INJECTION ONCE
Refills: 0 | Status: COMPLETED | OUTPATIENT
Start: 2019-11-21 | End: 2019-11-21

## 2019-11-21 RX ADMIN — SODIUM CHLORIDE 30 MILLILITER(S): 9 INJECTION, SOLUTION INTRAVENOUS at 08:48

## 2019-11-21 RX ADMIN — Medication 100 MILLIGRAM(S): at 10:44

## 2019-11-21 NOTE — ASU PATIENT PROFILE, ADULT - PMH
Carpal tunnel syndrome on both sides    h/o multiple b/l breast benign biopsies    in 2011  benign endometrial polyp    Menorrhagia    Mononucleosis age 53, accompanied with Hepatitis A    Native language Tongan--comprehends/verbalizes English well,  not warranted    Obesity

## 2019-11-21 NOTE — ASU PATIENT PROFILE, ADULT - PSH
History of dilation and curettage  2011  S/P Tonsillectomy History of dilation and curettage  2011  History of left hip replacement    S/P Tonsillectomy

## 2019-11-27 LAB — SURGICAL PATHOLOGY STUDY: SIGNIFICANT CHANGE UP

## 2019-12-03 ENCOUNTER — APPOINTMENT (OUTPATIENT)
Dept: INTERNAL MEDICINE | Facility: CLINIC | Age: 69
End: 2019-12-03
Payer: MEDICARE

## 2019-12-03 VITALS
WEIGHT: 183 LBS | TEMPERATURE: 98.6 F | OXYGEN SATURATION: 98 % | DIASTOLIC BLOOD PRESSURE: 86 MMHG | SYSTOLIC BLOOD PRESSURE: 138 MMHG | HEART RATE: 91 BPM | HEIGHT: 61 IN | BODY MASS INDEX: 34.55 KG/M2

## 2019-12-03 VITALS — SYSTOLIC BLOOD PRESSURE: 134 MMHG | DIASTOLIC BLOOD PRESSURE: 94 MMHG

## 2019-12-03 DIAGNOSIS — R03.0 ELEVATED BLOOD-PRESSURE READING, W/OUT DIAGNOSIS OF HYPERTENSION: ICD-10-CM

## 2019-12-03 DIAGNOSIS — R73.03 PREDIABETES.: ICD-10-CM

## 2019-12-03 DIAGNOSIS — E07.9 DISORDER OF THYROID, UNSPECIFIED: ICD-10-CM

## 2019-12-03 PROCEDURE — 90662 IIV NO PRSV INCREASED AG IM: CPT

## 2019-12-03 PROCEDURE — 99213 OFFICE O/P EST LOW 20 MIN: CPT | Mod: 25

## 2019-12-03 PROCEDURE — G0008: CPT

## 2019-12-03 NOTE — ASSESSMENT
[FreeTextEntry1] : Pt w above issues.\par Borderline thy func in past, check labs, start Synthr 25 mcg for now.\par elev BP- pt will monitor at home, she strongly believes it's white coat and resistant to starting anything.\par Hi dose flu shot given.\par Contin exercise; wt loss strategies discussed.

## 2019-12-03 NOTE — HISTORY OF PRESENT ILLNESS
[de-identified] : 68 yo woman w preDM, obesity, fibrocystic breasts, gerd, colo polyps, borderline thy func.\par c/o feeling sluggish, less energy than usual- sleeps well but during day after an activity needs a rest. Feels it also might be age-related.\par Is going to gym and dancing 3x/wk without probl.\par \par \par Recent left breast stereo biopsy, benign, has had several bx in past; needs 6 mo f/u = April '20\par Recent EGD/[place] - polyp, f/u 3 yrs\par remaining on Pantoprazole per Dr. Suero, prior severe gastritis/sxs.\par \par Flu shot:will do now [FreeTextEntry1] : f/u

## 2019-12-04 LAB
25(OH)D3 SERPL-MCNC: 49.9 NG/ML
ALBUMIN SERPL ELPH-MCNC: 5.2 G/DL
ALP BLD-CCNC: 59 U/L
ALT SERPL-CCNC: 32 U/L
ANION GAP SERPL CALC-SCNC: 14 MMOL/L
AST SERPL-CCNC: 23 U/L
BASOPHILS # BLD AUTO: 0.08 K/UL
BASOPHILS NFR BLD AUTO: 1.1 %
BILIRUB SERPL-MCNC: 0.6 MG/DL
BUN SERPL-MCNC: 15 MG/DL
CALCIUM SERPL-MCNC: 10.3 MG/DL
CHLORIDE SERPL-SCNC: 100 MMOL/L
CHOLEST SERPL-MCNC: 256 MG/DL
CHOLEST/HDLC SERPL: 2.3 RATIO
CO2 SERPL-SCNC: 27 MMOL/L
CREAT SERPL-MCNC: 0.67 MG/DL
EOSINOPHIL # BLD AUTO: 0.13 K/UL
EOSINOPHIL NFR BLD AUTO: 1.9 %
ESTIMATED AVERAGE GLUCOSE: 128 MG/DL
GLUCOSE SERPL-MCNC: 112 MG/DL
HBA1C MFR BLD HPLC: 6.1 %
HCT VFR BLD CALC: 46.5 %
HDLC SERPL-MCNC: 112 MG/DL
HGB BLD-MCNC: 15.2 G/DL
IMM GRANULOCYTES NFR BLD AUTO: 0.3 %
LDLC SERPL CALC-MCNC: 116 MG/DL
LYMPHOCYTES # BLD AUTO: 1.9 K/UL
LYMPHOCYTES NFR BLD AUTO: 27.2 %
MAN DIFF?: NORMAL
MCHC RBC-ENTMCNC: 28.8 PG
MCHC RBC-ENTMCNC: 32.7 GM/DL
MCV RBC AUTO: 88.2 FL
MONOCYTES # BLD AUTO: 0.59 K/UL
MONOCYTES NFR BLD AUTO: 8.5 %
NEUTROPHILS # BLD AUTO: 4.26 K/UL
NEUTROPHILS NFR BLD AUTO: 61 %
PLATELET # BLD AUTO: 332 K/UL
POTASSIUM SERPL-SCNC: 4.4 MMOL/L
PROT SERPL-MCNC: 7.7 G/DL
RBC # BLD: 5.27 M/UL
RBC # FLD: 14 %
SODIUM SERPL-SCNC: 141 MMOL/L
TRIGL SERPL-MCNC: 142 MG/DL
TSH SERPL-ACNC: 4.56 UIU/ML
WBC # FLD AUTO: 6.98 K/UL

## 2019-12-04 RX ORDER — SODIUM PICOSULFATE, MAGNESIUM OXIDE, AND ANHYDROUS CITRIC ACID 10; 3.5; 12 MG/160ML; G/160ML; G/160ML
10-3.5-12 MG-GM LIQUID ORAL
Qty: 1 | Refills: 0 | Status: DISCONTINUED | COMMUNITY
Start: 2019-05-15 | End: 2019-12-04

## 2020-03-10 ENCOUNTER — APPOINTMENT (OUTPATIENT)
Dept: INTERNAL MEDICINE | Facility: CLINIC | Age: 70
End: 2020-03-10

## 2020-03-27 ENCOUNTER — TRANSCRIPTION ENCOUNTER (OUTPATIENT)
Age: 70
End: 2020-03-27

## 2020-04-07 ENCOUNTER — APPOINTMENT (OUTPATIENT)
Dept: INTERNAL MEDICINE | Facility: CLINIC | Age: 70
End: 2020-04-07

## 2020-04-15 ENCOUNTER — APPOINTMENT (OUTPATIENT)
Dept: ULTRASOUND IMAGING | Facility: IMAGING CENTER | Age: 70
End: 2020-04-15

## 2020-04-15 ENCOUNTER — RESULT REVIEW (OUTPATIENT)
Age: 70
End: 2020-04-15

## 2020-04-15 ENCOUNTER — APPOINTMENT (OUTPATIENT)
Dept: MAMMOGRAPHY | Facility: IMAGING CENTER | Age: 70
End: 2020-04-15

## 2020-04-15 ENCOUNTER — OUTPATIENT (OUTPATIENT)
Dept: OUTPATIENT SERVICES | Facility: HOSPITAL | Age: 70
LOS: 1 days | End: 2020-04-15
Payer: MEDICARE

## 2020-04-15 DIAGNOSIS — Z98.89 OTHER SPECIFIED POSTPROCEDURAL STATES: Chronic | ICD-10-CM

## 2020-04-15 DIAGNOSIS — R92.8 OTHER ABNORMAL AND INCONCLUSIVE FINDINGS ON DIAGNOSTIC IMAGING OF BREAST: ICD-10-CM

## 2020-04-15 DIAGNOSIS — Z96.642 PRESENCE OF LEFT ARTIFICIAL HIP JOINT: Chronic | ICD-10-CM

## 2020-04-15 PROCEDURE — G0279: CPT

## 2020-04-15 PROCEDURE — 77065 DX MAMMO INCL CAD UNI: CPT | Mod: 26,LT

## 2020-04-15 PROCEDURE — 76641 ULTRASOUND BREAST COMPLETE: CPT

## 2020-04-15 PROCEDURE — 77065 DX MAMMO INCL CAD UNI: CPT

## 2020-04-15 PROCEDURE — 76641 ULTRASOUND BREAST COMPLETE: CPT | Mod: 26,LT

## 2020-04-15 PROCEDURE — G0279: CPT | Mod: 26

## 2020-07-15 ENCOUNTER — APPOINTMENT (OUTPATIENT)
Dept: GASTROENTEROLOGY | Facility: CLINIC | Age: 70
End: 2020-07-15
Payer: MEDICARE

## 2020-07-15 VITALS
SYSTOLIC BLOOD PRESSURE: 140 MMHG | WEIGHT: 190 LBS | DIASTOLIC BLOOD PRESSURE: 80 MMHG | HEART RATE: 95 BPM | BODY MASS INDEX: 35.87 KG/M2 | TEMPERATURE: 98.8 F | OXYGEN SATURATION: 97 % | HEIGHT: 61 IN | RESPIRATION RATE: 16 BRPM

## 2020-07-15 PROCEDURE — 99214 OFFICE O/P EST MOD 30 MIN: CPT

## 2020-07-15 NOTE — HISTORY OF PRESENT ILLNESS
[FreeTextEntry1] : Office revisit on 7/15/2020.\par \par Patient presents for followup regarding treatment of  GERD.\par \par The patient was previously evaluated for this consultation on 10/29/14.\par \par INITIAL CONSULTATION NOTE:\par \par Office consultation on 10/29/14. The patient is a 64-year-old woman who is being evaluated for a surveillance colonoscopy\par \par The patient is currently asymptomatic from a gastrointestinal standpoint. She has one formed bowel movement daily without any complaints of diarrhea, constipation, change in bowel habit or rectal bleeding. Her appetite and weight are stable. She reports no complaints of dysphagia, heartburn, nausea, vomiting or abdominal pain.\par \par The patient previously experienced irregular bowel movements. She could have a bowel movement every day but then may not move her bowels for 2 or 3 days. However, since starting a regimen of yogurt with either Greek yogurt or Activia she is having regular daily bowel movements without any irregularity or difficulty.\par \par In 2004 the patient underwent a colonoscopy at which time a 1cm sessile polyp at the rectosigmoid junction with high-grade dysplasia was removed. The patient underwent colonoscopy examinations in 2005, 2006 and 2008 at which time there were no neoplastic polyps removed. A few hyperplastic polyps were removed.\par \par The patient's last colonoscopy was on 4/27/10 at which time melanosis coli was noted. Polyps were not detected at that examination.\par \par COLONOSCOPY 12/31/14:    -The patient underwent a surveillance colonoscopy on 12/22/14. A total colonoscopy was performed to the cecum with ileoscopy. A 3 mm hepatic flexure adenoma, 4 mm proximal transverse colon hyperplastic polyp and a 3 mm distal sigmoid adenoma were removed. A followup surveillance colonoscopy is advised in 5 years. \par \par CURRENT HISTORY:\par \par ORV 12/22/17:    -Patient presented with complaint of heartburn of one year duration. Approximately one-year symptoms but with increase in severity over the past few months. Heartburn described as retrosternal burning. Mostly daytime symptoms and triggered by oral intake such as water and yogurt. Paradoxically fried and fatty food is less provocative. Experiences approximately 3-4 episodes per week. Gets some relief with Zantac 150 mg. Experiences sense of dysphagia with slow bolus passage both to liquids and solids. This is mild and intermittent.\par             -Appetite and weight are stable. Patient has one formed bowel movement daily without any complaints of diarrhea, constipation, change in bowel habit or rectal bleeding.\par               -Patient complains of postnasal drip with associated throat eructation and cough. Denies dyspnea, wheezing, chest pain, sinus congestion, hoarseness or focal change. Patient indicates that postnasal drip and cough and sometimes provoke nonbloody vomiting.\par \par EGD 1/25/18:    -EGD was performed on 1/25/18 to evaluate GERD symptoms and dysphagia. Examination of the esophagus revealed a 3 cm hiatus hernia in association with LA grade C erosive esophagitis. Distal esophagus biopsy revealed active esophagitis with ulcer consistent with reflux esophagitis. Dysplasia or any evidence of fungi or viral cytopathic change was not detected. Examination of the stomach was normal. Gastric antrum biopsy revealed a nonspecific reactive gastropathy. Testing was negative for Helicobacter pylori. The visualized duodenum to the second portion was normal.\par \par ORV 5/4/18:    -Patient presents for followup regarding recent diagnosis of erosive esophagitis. Currently doing very well on a regimen of pantoprazole 40 mg daily. No complaints of breakthrough heartburn, regurgitation or dysphagia.  Denies nausea, vomiting, abdominal pain or other GI symptoms.\par \par ORV 11/7/18:    -Patient presents for followup of history of GERD. Reports doing very well on current regimen of maintenance pantoprazole 40 mg q.d. No complaint of any breakthrough heartburn, regurgitation, nausea or vomiting. Appetite and weight are stable. Denies any complaints of abdominal pain, diarrhea, constipation, change in bowel habit or rectal bleeding.\par            -Patient underwent left THR 7/2018.\par \par ORV 5/15/19:    -Patient presents for followup regarding history of GERD. In addition, planned for surveillance colonoscopy. Currently doing very well on maintenance pantoprazole 40 mg q.d. Appetite and weight are stable. Denies any complaints of breakthrough heartburn, regurgitation or other reflux symptoms. Denies dysphagia, nausea, vomiting, abdominal pain, diarrhea, constipation, change in bowel habit or rectal bleeding.\par \par COLONOSCOPY 11/21/2019:       -Surveillance colonoscopy was performed on 11/21/19. Patient previously had removal of a colonic adenoma with high-grade dysplasia. Total colonoscopy was performed to the cecum with ileoscopy. Normal terminal ileum. A 3 mm diminutive cecal adenoma was removed. The colonoscopy examination was otherwise normal. A followup surveillance colonoscopy is advised in 3 years.\par \par EGD 11/21/2019:     -EGD was performed to evaluate a history of erosive GERD. Examination of the esophagus was normal except for a 2 cm hiatus hernia (Hill grade 2-3). There was no erosive esophagitis or Davila's esophagus. Biopsy of the esophagus was normal. Examination of the stomach was normal except for a few gastric body fundic gland polyps. Gastric biopsy was negative for Helicobacter pylori. Gastric intestinal metaplasia was not detected. Visualized duodenum to the second portion was normal. Patient was advised to continue current regimen of antisecretory therapy. \par \par ORV 7/15/2020:     -Presents for followup regarding history of GERD. Currently asymptomatic on pantoprazole 40 mg daily. Denies any complaints of breakthrough heartburn. Appetite is stable and may have gained a few pounds. No complaints of dysphagia, heartburn, nausea, vomiting or abdominal pain. Has daily formed bowel movements without report of diarrhea, constipation, change in bowel habit or rectal bleeding.\par

## 2020-07-15 NOTE — ASSESSMENT
[FreeTextEntry1] : Impression:\par \par #1 GERD- EGD on 1/25/18 noted for 3 cm hiatus hernia association with LA grade C erosive esophagitis. Davila's esophagus not detected. EGD on 11/21/19 was noted for normal esophagus with detection of a 2 cm hiatus hernia (Hill grade 2-3) without detection of erosive esophagitis or Davila's esophagus. Patient has derived an excellent symptomatic response to current regimen of pantoprazole 40 mg daily with resolution of heartburn and dysphagia. Asymptomatic at the current time.\par \par #2 postnasal drip-chronic cough. Likely separate unrelated problem although GERD may be exacerbating these ENT symptoms. Resolved at the current time.\par \par #3 history of colonic adenomatous polyps-status post removal of a 1 cm sessile rectosigmoid junction polyp with high-grade dysplasia in 2004 and status post removal of a 3 mm hepatic flexure adenoma, 4 mm proximal transverse colon hyperplastic polyp and 3 mm distal sigmoid adenoma at the 12/24/14 colonoscopy. Colonoscopy on 11/21/2019 noted removal of a 3 mm diminutive cecal adenoma.\par \par #4 history of infectious mononucleosis hepatitis in 2004.\par \par #5 obesity- BMI 35.9.

## 2020-07-15 NOTE — PHYSICAL EXAM
[General Appearance - Alert] : alert [General Appearance - In No Acute Distress] : in no acute distress [General Appearance - Well Developed] : well developed [General Appearance - Well-Appearing] : healthy appearing [Sclera] : the sclera and conjunctiva were normal [Oropharynx] : the oropharynx was normal [Neck Appearance] : the appearance of the neck was normal [Neck Cervical Mass (___cm)] : no neck mass was observed [Respiration, Rhythm And Depth] : normal respiratory rhythm and effort [Auscultation Breath Sounds / Voice Sounds] : lungs were clear to auscultation bilaterally [Heart Rate And Rhythm] : heart rate was normal and rhythm regular [Exaggerated Use Of Accessory Muscles For Inspiration] : no accessory muscle use [Heart Sounds] : normal S1 and S2 [Heart Sounds Gallop] : no gallops [Murmurs] : no murmurs [Heart Sounds Pericardial Friction Rub] : no pericardial rub [Abdomen Soft] : soft [Edema] : there was no peripheral edema [Abdomen Mass (___ Cm)] : no abdominal mass palpated [] : no hepato-splenomegaly [Abdomen Hernia] : no hernia was discovered [Abdomen Tenderness] : non-tender [Cervical Lymph Nodes Enlarged Posterior Bilaterally] : posterior cervical [Cervical Lymph Nodes Enlarged Anterior Bilaterally] : anterior cervical [Abnormal Walk] : normal gait [Supraclavicular Lymph Nodes Enlarged Bilaterally] : supraclavicular [Nail Clubbing] : no clubbing  or cyanosis of the fingernails [Skin Color & Pigmentation] : normal skin color and pigmentation [Oriented To Time, Place, And Person] : oriented to person, place, and time [Impaired Insight] : insight and judgment were intact [Affect] : the affect was normal [Mood] : the mood was normal [FreeTextEntry1] : The abdomen is mildly obese, soft, nontender, nondistended and without mass or hepatosplenomegaly.

## 2020-07-15 NOTE — CONSULT LETTER
[Dear  ___] : Dear  [unfilled], [Please see my note below.] : Please see my note below. [Sincerely,] : Sincerely, [Consult Closing:] : Thank you very much for allowing me to participate in the care of this patient.  If you have any questions, please do not hesitate to contact me. [FreeTextEntry3] : Zak Suero M.D. [FreeTextEntry2] : Dr. Elinor Barahona

## 2020-07-24 ENCOUNTER — TRANSCRIPTION ENCOUNTER (OUTPATIENT)
Age: 70
End: 2020-07-24

## 2020-08-06 ENCOUNTER — TRANSCRIPTION ENCOUNTER (OUTPATIENT)
Age: 70
End: 2020-08-06

## 2020-08-07 ENCOUNTER — TRANSCRIPTION ENCOUNTER (OUTPATIENT)
Age: 70
End: 2020-08-07

## 2020-08-14 ENCOUNTER — TRANSCRIPTION ENCOUNTER (OUTPATIENT)
Age: 70
End: 2020-08-14

## 2020-08-21 ENCOUNTER — TRANSCRIPTION ENCOUNTER (OUTPATIENT)
Age: 70
End: 2020-08-21

## 2020-09-15 ENCOUNTER — APPOINTMENT (OUTPATIENT)
Dept: INTERNAL MEDICINE | Facility: CLINIC | Age: 70
End: 2020-09-15
Payer: MEDICARE

## 2020-09-15 VITALS
SYSTOLIC BLOOD PRESSURE: 153 MMHG | BODY MASS INDEX: 35.68 KG/M2 | DIASTOLIC BLOOD PRESSURE: 93 MMHG | WEIGHT: 189 LBS | OXYGEN SATURATION: 97 % | TEMPERATURE: 96.9 F | HEART RATE: 91 BPM | HEIGHT: 61 IN

## 2020-09-15 VITALS — DIASTOLIC BLOOD PRESSURE: 80 MMHG | SYSTOLIC BLOOD PRESSURE: 130 MMHG

## 2020-09-15 PROCEDURE — 90662 IIV NO PRSV INCREASED AG IM: CPT

## 2020-09-15 PROCEDURE — G0444 DEPRESSION SCREEN ANNUAL: CPT | Mod: 59

## 2020-09-15 PROCEDURE — G0439: CPT

## 2020-09-15 PROCEDURE — G0008: CPT

## 2020-09-15 NOTE — HEALTH RISK ASSESSMENT
[Good] : ~his/her~  mood as  good [No] : No [With Significant Other] : lives with significant other [Patient/Caregiver unclear of wishes] : Patient/Caregiver unclear of wishes [Designated Healthcare Proxy] : Designated healthcare proxy [Name: ___] : Health Care Proxy's Name: [unfilled]  [Relationship: ___] : Relationship: [unfilled] [No falls in past year] : Patient reported no falls in the past year [0] : 2) Feeling down, depressed, or hopeless: Not at all (0) [HIV test declined] : HIV test declined [Hepatitis C test declined] : Hepatitis C test declined [None] : None [Retired] : retired [] :  [Fully functional (bathing, dressing, toileting, transferring, walking, feeding)] : Fully functional (bathing, dressing, toileting, transferring, walking, feeding) [Fully functional (using the telephone, shopping, preparing meals, housekeeping, doing laundry, using] : Fully functional and needs no help or supervision to perform IADLs (using the telephone, shopping, preparing meals, housekeeping, doing laundry, using transportation, managing medications and managing finances) [Smoke Detector] : smoke detector [Carbon Monoxide Detector] : carbon monoxide detector [Safety elements used in home] : safety elements used in home [Seat Belt] :  uses seat belt [Sunscreen] : uses sunscreen [] : No [de-identified] : some [de-identified] : healthy [Change in mental status noted] : No change in mental status noted [Language] : denies difficulty with language [Reports changes in hearing] : Reports no changes in hearing [Reports changes in vision] : Reports no changes in vision [Reports changes in dental health] : Reports no changes in dental health [TB Exposure] : is not being exposed to tuberculosis [Caregiver Concerns] : does not have caregiver concerns [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

## 2020-09-15 NOTE — HISTORY OF PRESENT ILLNESS
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 Cristin lopez good relief.\par \par Tried to wean off Pantoprazole, was unable, GI aware.\par EGD/[place] '19\par Mammo/US due, has appointmt, certain areas close f/u\par Flu shot: will do today\par Tdap: she will check records, we believe it was ~ '14\par Optho due, holding off due to Covid\par \par Meds: Pantop and Levothy 25\par Feels very well on thy replacemt

## 2020-09-15 NOTE — PHYSICAL EXAM
[No Acute Distress] : no acute distress [Well Nourished] : well nourished [Well Developed] : well developed [Well-Appearing] : well-appearing [Normal Sclera/Conjunctiva] : normal sclera/conjunctiva [PERRL] : pupils equal round and reactive to light [EOMI] : extraocular movements intact [Normal Outer Ear/Nose] : the outer ears and nose were normal in appearance [Normal Oropharynx] : the oropharynx was normal [No Lymphadenopathy] : no lymphadenopathy [No JVD] : no jugular venous distention [Supple] : supple [Thyroid Normal, No Nodules] : the thyroid was normal and there were no nodules present [No Respiratory Distress] : no respiratory distress  [No Accessory Muscle Use] : no accessory muscle use [Clear to Auscultation] : lungs were clear to auscultation bilaterally [Normal Rate] : normal rate  [Regular Rhythm] : with a regular rhythm [Normal S1, S2] : normal S1 and S2 [No Murmur] : no murmur heard [No Carotid Bruits] : no carotid bruits [No Abdominal Bruit] : a ~M bruit was not heard ~T in the abdomen [No Varicosities] : no varicosities [Pedal Pulses Present] : the pedal pulses are present [No Edema] : there was no peripheral edema [No Palpable Aorta] : no palpable aorta [No Extremity Clubbing/Cyanosis] : no extremity clubbing/cyanosis [Soft] : abdomen soft [Non Tender] : non-tender [Non-distended] : non-distended [No Masses] : no abdominal mass palpated [No HSM] : no HSM [Normal Bowel Sounds] : normal bowel sounds [Normal Posterior Cervical Nodes] : no posterior cervical lymphadenopathy [Normal Anterior Cervical Nodes] : no anterior cervical lymphadenopathy [No CVA Tenderness] : no CVA  tenderness [No Spinal Tenderness] : no spinal tenderness [No Joint Swelling] : no joint swelling [Grossly Normal Strength/Tone] : grossly normal strength/tone [No Rash] : no rash [Coordination Grossly Intact] : coordination grossly intact [No Focal Deficits] : no focal deficits [Normal Gait] : normal gait [Normal Insight/Judgement] : insight and judgment were intact [Normal Affect] : the affect was normal

## 2020-09-15 NOTE — ASSESSMENT
[FreeTextEntry1] : Pt is doing well.\par Upcoming diagnostic mammo/US\par check labs, covid\par Flu shot given\par contin to monitor BP periodically at home.\par

## 2020-09-16 LAB
25(OH)D3 SERPL-MCNC: 43.6 NG/ML
BASOPHILS # BLD AUTO: 0.07 K/UL
BASOPHILS NFR BLD AUTO: 1.1 %
EOSINOPHIL # BLD AUTO: 0.1 K/UL
EOSINOPHIL NFR BLD AUTO: 1.6 %
ESTIMATED AVERAGE GLUCOSE: 123 MG/DL
HBA1C MFR BLD HPLC: 5.9 %
HCT VFR BLD CALC: 48.8 %
HGB BLD-MCNC: 14.3 G/DL
IMM GRANULOCYTES NFR BLD AUTO: 0.3 %
LYMPHOCYTES # BLD AUTO: 1.52 K/UL
LYMPHOCYTES NFR BLD AUTO: 24.3 %
MAN DIFF?: NORMAL
MCHC RBC-ENTMCNC: 28 PG
MCHC RBC-ENTMCNC: 29.3 GM/DL
MCV RBC AUTO: 95.5 FL
MONOCYTES # BLD AUTO: 0.49 K/UL
MONOCYTES NFR BLD AUTO: 7.8 %
NEUTROPHILS # BLD AUTO: 4.06 K/UL
NEUTROPHILS NFR BLD AUTO: 64.9 %
PLATELET # BLD AUTO: 283 K/UL
RBC # BLD: 5.11 M/UL
RBC # FLD: 15.1 %
SARS-COV-2 IGG SERPL IA-ACNC: 0.09 INDEX
SARS-COV-2 IGG SERPL QL IA: NEGATIVE
TSH SERPL-ACNC: 2.76 UIU/ML
WBC # FLD AUTO: 6.26 K/UL

## 2020-09-19 ENCOUNTER — TRANSCRIPTION ENCOUNTER (OUTPATIENT)
Age: 70
End: 2020-09-19

## 2020-09-19 LAB
ALBUMIN SERPL ELPH-MCNC: 5.1 G/DL
ALP BLD-CCNC: 66 U/L
ALT SERPL-CCNC: 29 U/L
ANION GAP SERPL CALC-SCNC: 19 MMOL/L
AST SERPL-CCNC: 23 U/L
BILIRUB SERPL-MCNC: 0.5 MG/DL
BUN SERPL-MCNC: 13 MG/DL
CALCIUM SERPL-MCNC: 10.2 MG/DL
CHLORIDE SERPL-SCNC: 102 MMOL/L
CHOLEST SERPL-MCNC: 223 MG/DL
CHOLEST/HDLC SERPL: 2.4 RATIO
CO2 SERPL-SCNC: 21 MMOL/L
CREAT SERPL-MCNC: 0.69 MG/DL
GLUCOSE SERPL-MCNC: 116 MG/DL
HDLC SERPL-MCNC: 94 MG/DL
LDLC SERPL CALC-MCNC: 110 MG/DL
POTASSIUM SERPL-SCNC: 4.1 MMOL/L
PROT SERPL-MCNC: 7 G/DL
SODIUM SERPL-SCNC: 142 MMOL/L
TRIGL SERPL-MCNC: 97 MG/DL

## 2020-09-20 ENCOUNTER — TRANSCRIPTION ENCOUNTER (OUTPATIENT)
Age: 70
End: 2020-09-20

## 2020-09-22 ENCOUNTER — TRANSCRIPTION ENCOUNTER (OUTPATIENT)
Age: 70
End: 2020-09-22

## 2020-09-23 ENCOUNTER — TRANSCRIPTION ENCOUNTER (OUTPATIENT)
Age: 70
End: 2020-09-23

## 2020-10-21 ENCOUNTER — APPOINTMENT (OUTPATIENT)
Dept: ULTRASOUND IMAGING | Facility: IMAGING CENTER | Age: 70
End: 2020-10-21
Payer: MEDICARE

## 2020-10-21 ENCOUNTER — RESULT REVIEW (OUTPATIENT)
Age: 70
End: 2020-10-21

## 2020-10-21 ENCOUNTER — APPOINTMENT (OUTPATIENT)
Dept: MAMMOGRAPHY | Facility: IMAGING CENTER | Age: 70
End: 2020-10-21
Payer: MEDICARE

## 2020-10-21 ENCOUNTER — OUTPATIENT (OUTPATIENT)
Dept: OUTPATIENT SERVICES | Facility: HOSPITAL | Age: 70
LOS: 1 days | End: 2020-10-21
Payer: MEDICARE

## 2020-10-21 DIAGNOSIS — Z98.89 OTHER SPECIFIED POSTPROCEDURAL STATES: Chronic | ICD-10-CM

## 2020-10-21 DIAGNOSIS — Z96.642 PRESENCE OF LEFT ARTIFICIAL HIP JOINT: Chronic | ICD-10-CM

## 2020-10-21 DIAGNOSIS — R92.2 INCONCLUSIVE MAMMOGRAM: ICD-10-CM

## 2020-10-21 PROCEDURE — 77063 BREAST TOMOSYNTHESIS BI: CPT | Mod: 26

## 2020-10-21 PROCEDURE — 77067 SCR MAMMO BI INCL CAD: CPT

## 2020-10-21 PROCEDURE — 76641 ULTRASOUND BREAST COMPLETE: CPT

## 2020-10-21 PROCEDURE — 77063 BREAST TOMOSYNTHESIS BI: CPT

## 2020-10-21 PROCEDURE — 77067 SCR MAMMO BI INCL CAD: CPT | Mod: 26

## 2020-10-21 PROCEDURE — 76641 ULTRASOUND BREAST COMPLETE: CPT | Mod: 26,50

## 2020-10-22 ENCOUNTER — NON-APPOINTMENT (OUTPATIENT)
Age: 70
End: 2020-10-22

## 2020-10-27 ENCOUNTER — TRANSCRIPTION ENCOUNTER (OUTPATIENT)
Age: 70
End: 2020-10-27

## 2020-10-29 ENCOUNTER — TRANSCRIPTION ENCOUNTER (OUTPATIENT)
Age: 70
End: 2020-10-29

## 2020-10-29 ENCOUNTER — APPOINTMENT (OUTPATIENT)
Dept: ULTRASOUND IMAGING | Facility: IMAGING CENTER | Age: 70
End: 2020-10-29
Payer: MEDICARE

## 2020-10-29 ENCOUNTER — RESULT REVIEW (OUTPATIENT)
Age: 70
End: 2020-10-29

## 2020-10-29 ENCOUNTER — OUTPATIENT (OUTPATIENT)
Dept: OUTPATIENT SERVICES | Facility: HOSPITAL | Age: 70
LOS: 1 days | End: 2020-10-29
Payer: MEDICARE

## 2020-10-29 DIAGNOSIS — R92.8 OTHER ABNORMAL AND INCONCLUSIVE FINDINGS ON DIAGNOSTIC IMAGING OF BREAST: ICD-10-CM

## 2020-10-29 DIAGNOSIS — Z98.89 OTHER SPECIFIED POSTPROCEDURAL STATES: Chronic | ICD-10-CM

## 2020-10-29 DIAGNOSIS — Z96.642 PRESENCE OF LEFT ARTIFICIAL HIP JOINT: Chronic | ICD-10-CM

## 2020-10-29 PROCEDURE — 88305 TISSUE EXAM BY PATHOLOGIST: CPT | Mod: 26

## 2020-10-29 PROCEDURE — A4648: CPT

## 2020-10-29 PROCEDURE — 77065 DX MAMMO INCL CAD UNI: CPT

## 2020-10-29 PROCEDURE — 19083 BX BREAST 1ST LESION US IMAG: CPT

## 2020-10-29 PROCEDURE — 88305 TISSUE EXAM BY PATHOLOGIST: CPT

## 2020-10-29 PROCEDURE — 19083 BX BREAST 1ST LESION US IMAG: CPT | Mod: RT

## 2020-10-29 PROCEDURE — 77065 DX MAMMO INCL CAD UNI: CPT | Mod: 26,RT

## 2020-10-30 LAB — SURGICAL PATHOLOGY STUDY: SIGNIFICANT CHANGE UP

## 2020-11-18 ENCOUNTER — APPOINTMENT (OUTPATIENT)
Dept: SURGERY | Facility: CLINIC | Age: 70
End: 2020-11-18
Payer: MEDICARE

## 2020-11-18 PROCEDURE — 99204K: CUSTOM

## 2020-11-23 ENCOUNTER — OUTPATIENT (OUTPATIENT)
Dept: OUTPATIENT SERVICES | Facility: HOSPITAL | Age: 70
LOS: 1 days | End: 2020-11-23
Payer: MEDICARE

## 2020-11-23 ENCOUNTER — RESULT REVIEW (OUTPATIENT)
Age: 70
End: 2020-11-23

## 2020-11-23 ENCOUNTER — APPOINTMENT (OUTPATIENT)
Dept: MAMMOGRAPHY | Facility: IMAGING CENTER | Age: 70
End: 2020-11-23
Payer: MEDICARE

## 2020-11-23 VITALS
WEIGHT: 188.05 LBS | SYSTOLIC BLOOD PRESSURE: 118 MMHG | TEMPERATURE: 98 F | RESPIRATION RATE: 16 BRPM | HEART RATE: 89 BPM | HEIGHT: 60.5 IN | OXYGEN SATURATION: 98 % | DIASTOLIC BLOOD PRESSURE: 66 MMHG

## 2020-11-23 DIAGNOSIS — N63.10 UNSPECIFIED LUMP IN THE RIGHT BREAST, UNSPECIFIED QUADRANT: ICD-10-CM

## 2020-11-23 DIAGNOSIS — Z98.89 OTHER SPECIFIED POSTPROCEDURAL STATES: Chronic | ICD-10-CM

## 2020-11-23 DIAGNOSIS — Z98.890 OTHER SPECIFIED POSTPROCEDURAL STATES: Chronic | ICD-10-CM

## 2020-11-23 DIAGNOSIS — R92.8 OTHER ABNORMAL AND INCONCLUSIVE FINDINGS ON DIAGNOSTIC IMAGING OF BREAST: ICD-10-CM

## 2020-11-23 DIAGNOSIS — Z96.642 PRESENCE OF LEFT ARTIFICIAL HIP JOINT: Chronic | ICD-10-CM

## 2020-11-23 DIAGNOSIS — Z00.8 ENCOUNTER FOR OTHER GENERAL EXAMINATION: ICD-10-CM

## 2020-11-23 LAB
ALBUMIN SERPL ELPH-MCNC: 4.8 G/DL — SIGNIFICANT CHANGE UP (ref 3.3–5)
ALP SERPL-CCNC: 64 U/L — SIGNIFICANT CHANGE UP (ref 40–120)
ALT FLD-CCNC: 26 U/L — SIGNIFICANT CHANGE UP (ref 4–33)
ANION GAP SERPL CALC-SCNC: 12 MMO/L — SIGNIFICANT CHANGE UP (ref 7–14)
AST SERPL-CCNC: 20 U/L — SIGNIFICANT CHANGE UP (ref 4–32)
BILIRUB SERPL-MCNC: 0.6 MG/DL — SIGNIFICANT CHANGE UP (ref 0.2–1.2)
BUN SERPL-MCNC: 18 MG/DL — SIGNIFICANT CHANGE UP (ref 7–23)
CALCIUM SERPL-MCNC: 9.9 MG/DL — SIGNIFICANT CHANGE UP (ref 8.4–10.5)
CHLORIDE SERPL-SCNC: 100 MMOL/L — SIGNIFICANT CHANGE UP (ref 98–107)
CO2 SERPL-SCNC: 27 MMOL/L — SIGNIFICANT CHANGE UP (ref 22–31)
CREAT SERPL-MCNC: 0.66 MG/DL — SIGNIFICANT CHANGE UP (ref 0.5–1.3)
GLUCOSE SERPL-MCNC: 106 MG/DL — HIGH (ref 70–99)
HCT VFR BLD CALC: 44.2 % — SIGNIFICANT CHANGE UP (ref 34.5–45)
HGB BLD-MCNC: 13.9 G/DL — SIGNIFICANT CHANGE UP (ref 11.5–15.5)
MCHC RBC-ENTMCNC: 27.7 PG — SIGNIFICANT CHANGE UP (ref 27–34)
MCHC RBC-ENTMCNC: 31.4 % — LOW (ref 32–36)
MCV RBC AUTO: 88.2 FL — SIGNIFICANT CHANGE UP (ref 80–100)
NRBC # FLD: 0 K/UL — SIGNIFICANT CHANGE UP (ref 0–0)
PLATELET # BLD AUTO: 308 K/UL — SIGNIFICANT CHANGE UP (ref 150–400)
PMV BLD: 11.2 FL — SIGNIFICANT CHANGE UP (ref 7–13)
POTASSIUM SERPL-MCNC: 4 MMOL/L — SIGNIFICANT CHANGE UP (ref 3.5–5.3)
POTASSIUM SERPL-SCNC: 4 MMOL/L — SIGNIFICANT CHANGE UP (ref 3.5–5.3)
PROT SERPL-MCNC: 7.3 G/DL — SIGNIFICANT CHANGE UP (ref 6–8.3)
RBC # BLD: 5.01 M/UL — SIGNIFICANT CHANGE UP (ref 3.8–5.2)
RBC # FLD: 13.9 % — SIGNIFICANT CHANGE UP (ref 10.3–14.5)
SODIUM SERPL-SCNC: 139 MMOL/L — SIGNIFICANT CHANGE UP (ref 135–145)
WBC # BLD: 6.34 K/UL — SIGNIFICANT CHANGE UP (ref 3.8–10.5)
WBC # FLD AUTO: 6.34 K/UL — SIGNIFICANT CHANGE UP (ref 3.8–10.5)

## 2020-11-23 PROCEDURE — 19281 PERQ DEVICE BREAST 1ST IMAG: CPT | Mod: RT

## 2020-11-23 PROCEDURE — 19281 PERQ DEVICE BREAST 1ST IMAG: CPT

## 2020-11-23 PROCEDURE — C1739: CPT

## 2020-11-23 PROCEDURE — 93010 ELECTROCARDIOGRAM REPORT: CPT

## 2020-11-23 RX ORDER — ASCORBIC ACID 60 MG
1 TABLET,CHEWABLE ORAL
Qty: 0 | Refills: 0 | DISCHARGE

## 2020-11-23 RX ORDER — MULTIVIT-MIN/FERROUS GLUCONATE 9 MG/15 ML
1 LIQUID (ML) ORAL
Qty: 0 | Refills: 0 | DISCHARGE

## 2020-11-23 RX ORDER — PANTOPRAZOLE SODIUM 20 MG/1
1 TABLET, DELAYED RELEASE ORAL
Qty: 0 | Refills: 0 | DISCHARGE

## 2020-11-23 RX ORDER — OMEGA-3 ACID ETHYL ESTERS 1 G
1 CAPSULE ORAL
Qty: 0 | Refills: 0 | DISCHARGE

## 2020-11-23 RX ORDER — CHOLECALCIFEROL (VITAMIN D3) 125 MCG
1 CAPSULE ORAL
Qty: 0 | Refills: 0 | DISCHARGE

## 2020-11-23 RX ORDER — MAGNESIUM OXIDE 400 MG ORAL TABLET 241.3 MG
1 TABLET ORAL
Qty: 0 | Refills: 0 | DISCHARGE

## 2020-11-23 RX ORDER — CHOLINE 650 MG
310 TABLET ORAL
Qty: 0 | Refills: 0 | DISCHARGE

## 2020-11-23 RX ORDER — VITAMIN E 100 UNIT
1 CAPSULE ORAL
Qty: 0 | Refills: 0 | DISCHARGE

## 2020-11-23 RX ORDER — PREGABALIN 225 MG/1
1 CAPSULE ORAL
Qty: 0 | Refills: 0 | DISCHARGE

## 2020-11-23 RX ORDER — ZINC SULFATE TAB 220 MG (50 MG ZINC EQUIVALENT) 220 (50 ZN) MG
1 TAB ORAL
Qty: 0 | Refills: 0 | DISCHARGE

## 2020-11-23 RX ORDER — UBIDECARENONE 100 MG
1 CAPSULE ORAL
Qty: 0 | Refills: 0 | DISCHARGE

## 2020-11-23 RX ORDER — CHROMIUM PICOLINATE 200 MCG
1 TABLET ORAL
Qty: 0 | Refills: 0 | DISCHARGE

## 2020-11-23 RX ORDER — SODIUM CHLORIDE 9 MG/ML
1000 INJECTION, SOLUTION INTRAVENOUS
Refills: 0 | Status: DISCONTINUED | OUTPATIENT
Start: 2020-12-01 | End: 2020-12-15

## 2020-11-23 NOTE — H&P PST ADULT - ASSESSMENT
Pt. is a 71 yo female with a right breast mass.     Pt. instructed that today, 11/23/20 is the last day for the above supplements until after the surgery.

## 2020-11-23 NOTE — H&P PST ADULT - NSICDXPASTSURGICALHX_GEN_ALL_CORE_FT
PAST SURGICAL HISTORY:  History of bilateral carpal tunnel release     History of dilation and curettage 2011    History of left hip replacement 7/2/2018    S/P Tonsillectomy      PAST SURGICAL HISTORY:  H/O breast biopsy KATE, 15 total, last was on the left 10/2019    History of bilateral carpal tunnel release right-7/23/2014, left-8/6/2014    History of dilation and curettage 2011    History of left hip replacement 7/2/2018    S/P Tonsillectomy

## 2020-11-23 NOTE — H&P PST ADULT - NSICDXPROBLEM_GEN_ALL_CORE_FT
PROBLEM DIAGNOSES  Problem: Mass of right breast  Assessment and Plan: Pt. is scheduled for a right breast biopsy with seed localization 12/1/20.  Pt. verbalized understanding of instructions and that Chlorhexidine is for external use.  Pt. is scheduled for COVID test.

## 2020-11-23 NOTE — H&P PST ADULT - NSICDXFAMILYHX_GEN_ALL_CORE_FT
FAMILY HISTORY:  Father  Still living? No  Family history of pancreatic cancer, Age at diagnosis: Age Unknown    Mother  Still living? No  Family history of melanoma, Age at diagnosis: Age Unknown

## 2020-11-23 NOTE — H&P PST ADULT - NSICDXPASTMEDICALHX_GEN_ALL_CORE_FT
PAST MEDICAL HISTORY:  Carpal tunnel syndrome on both sides     h/o multiple b/l breast benign biopsies     in 2011  benign endometrial polyp     Menorrhagia     Mononucleosis age 53, accompanied with Hepatitis A     Native language [place]--comprehends/verbalizes English well,  not warranted     Obesity      PAST MEDICAL HISTORY:  Carpal tunnel syndrome on both sides     h/o multiple b/l breast benign biopsies     Hypothyroidism     in 2011  benign endometrial polyp     Menorrhagia     Mononucleosis age 53, accompanied with Hepatitis A     Native language British--comprehends/verbalizes English well,  not warranted     Obesity

## 2020-11-25 PROBLEM — E03.9 HYPOTHYROIDISM, UNSPECIFIED: Chronic | Status: ACTIVE | Noted: 2020-11-23

## 2020-11-27 DIAGNOSIS — Z01.818 ENCOUNTER FOR OTHER PREPROCEDURAL EXAMINATION: ICD-10-CM

## 2020-11-28 ENCOUNTER — APPOINTMENT (OUTPATIENT)
Dept: DISASTER EMERGENCY | Facility: CLINIC | Age: 70
End: 2020-11-28

## 2020-11-29 LAB — SARS-COV-2 N GENE NPH QL NAA+PROBE: NOT DETECTED

## 2020-11-30 NOTE — ASU PATIENT PROFILE, ADULT - PSH
H/O breast biopsy  KATE, 15 total, last was on the left 10/2019  History of bilateral carpal tunnel release  right-7/23/2014, left-8/6/2014  History of dilation and curettage  2011  History of left hip replacement  7/2/2018  S/P Tonsillectomy

## 2020-11-30 NOTE — ASU PATIENT PROFILE, ADULT - PMH
Carpal tunnel syndrome on both sides    h/o multiple b/l breast benign biopsies    Hypothyroidism    in 2011  benign endometrial polyp    Menorrhagia    Mononucleosis age 53, accompanied with Hepatitis A    Native language Portuguese--comprehends/verbalizes English well,  not warranted    Obesity

## 2020-12-01 ENCOUNTER — RESULT REVIEW (OUTPATIENT)
Age: 70
End: 2020-12-01

## 2020-12-01 ENCOUNTER — OUTPATIENT (OUTPATIENT)
Dept: OUTPATIENT SERVICES | Facility: HOSPITAL | Age: 70
LOS: 1 days | Discharge: HOME | End: 2020-12-01
Payer: MEDICARE

## 2020-12-01 ENCOUNTER — APPOINTMENT (OUTPATIENT)
Dept: SURGERY | Facility: HOSPITAL | Age: 70
End: 2020-12-01

## 2020-12-01 VITALS
TEMPERATURE: 99 F | WEIGHT: 188.05 LBS | HEIGHT: 60.5 IN | OXYGEN SATURATION: 97 % | DIASTOLIC BLOOD PRESSURE: 73 MMHG | HEART RATE: 100 BPM | SYSTOLIC BLOOD PRESSURE: 170 MMHG | RESPIRATION RATE: 18 BRPM

## 2020-12-01 VITALS — TEMPERATURE: 98 F

## 2020-12-01 DIAGNOSIS — Z98.89 OTHER SPECIFIED POSTPROCEDURAL STATES: Chronic | ICD-10-CM

## 2020-12-01 DIAGNOSIS — R92.8 OTHER ABNORMAL AND INCONCLUSIVE FINDINGS ON DIAGNOSTIC IMAGING OF BREAST: ICD-10-CM

## 2020-12-01 DIAGNOSIS — Z98.890 OTHER SPECIFIED POSTPROCEDURAL STATES: Chronic | ICD-10-CM

## 2020-12-01 DIAGNOSIS — Z96.642 PRESENCE OF LEFT ARTIFICIAL HIP JOINT: Chronic | ICD-10-CM

## 2020-12-01 PROCEDURE — 19125K: CUSTOM | Mod: RT

## 2020-12-01 PROCEDURE — 88305 TISSUE EXAM BY PATHOLOGIST: CPT | Mod: 26

## 2020-12-01 PROCEDURE — 76098 X-RAY EXAM SURGICAL SPECIMEN: CPT | Mod: 26

## 2020-12-01 RX ORDER — CHOLINE 650 MG
310 TABLET ORAL
Qty: 0 | Refills: 0 | DISCHARGE

## 2020-12-01 RX ORDER — PANTOPRAZOLE SODIUM 20 MG/1
1 TABLET, DELAYED RELEASE ORAL
Qty: 0 | Refills: 0 | DISCHARGE

## 2020-12-01 RX ORDER — MAGNESIUM OXIDE 400 MG ORAL TABLET 241.3 MG
1 TABLET ORAL
Qty: 0 | Refills: 0 | DISCHARGE

## 2020-12-01 RX ORDER — VITAMIN E 100 UNIT
1 CAPSULE ORAL
Qty: 0 | Refills: 0 | DISCHARGE

## 2020-12-01 RX ORDER — ZINC SULFATE TAB 220 MG (50 MG ZINC EQUIVALENT) 220 (50 ZN) MG
1 TAB ORAL
Qty: 0 | Refills: 0 | DISCHARGE

## 2020-12-01 RX ORDER — OMEGA-3 ACID ETHYL ESTERS 1 G
1 CAPSULE ORAL
Qty: 0 | Refills: 0 | DISCHARGE

## 2020-12-01 RX ORDER — PREGABALIN 225 MG/1
1 CAPSULE ORAL
Qty: 0 | Refills: 0 | DISCHARGE

## 2020-12-01 RX ORDER — MULTIVIT-MIN/FERROUS GLUCONATE 9 MG/15 ML
1 LIQUID (ML) ORAL
Qty: 0 | Refills: 0 | DISCHARGE

## 2020-12-01 RX ORDER — CHROMIUM PICOLINATE 200 MCG
1 TABLET ORAL
Qty: 0 | Refills: 0 | DISCHARGE

## 2020-12-01 RX ORDER — DOCUSATE SODIUM 100 MG
1 CAPSULE ORAL
Qty: 0 | Refills: 0 | DISCHARGE

## 2020-12-01 RX ORDER — LEVOTHYROXINE SODIUM 125 MCG
1 TABLET ORAL
Qty: 0 | Refills: 0 | DISCHARGE

## 2020-12-01 RX ORDER — UBIDECARENONE 100 MG
1 CAPSULE ORAL
Qty: 0 | Refills: 0 | DISCHARGE

## 2020-12-01 RX ORDER — CHOLECALCIFEROL (VITAMIN D3) 125 MCG
3 CAPSULE ORAL
Qty: 0 | Refills: 0 | DISCHARGE

## 2020-12-01 RX ORDER — ASCORBIC ACID 60 MG
1 TABLET,CHEWABLE ORAL
Qty: 0 | Refills: 0 | DISCHARGE

## 2020-12-01 RX ORDER — SELENIOUS ACID 40 UG/ML
1 INJECTION, SOLUTION INTRAVENOUS
Qty: 0 | Refills: 0 | DISCHARGE

## 2020-12-01 RX ADMIN — SODIUM CHLORIDE 30 MILLILITER(S): 9 INJECTION, SOLUTION INTRAVENOUS at 09:38

## 2020-12-01 NOTE — ASU DISCHARGE PLAN (ADULT/PEDIATRIC) - NURSING INSTRUCTIONS
DO NOT take any Tylenol (Acetaminophen) or narcotics containing Tylenol until after  _4pm_____ . You received Tylenol during your operation and it can cause damage to your liver if too much is taken within a 24 hour time period.

## 2020-12-01 NOTE — ASU DISCHARGE PLAN (ADULT/PEDIATRIC) - CARE PROVIDER_API CALL
Maine Butt  [facility] BREAST SURGERY  [address], [address]  [place], [place] [id number]  Phone: [phone]  Fax: [phone]  Follow Up Time:

## 2020-12-01 NOTE — ASU DISCHARGE PLAN (ADULT/PEDIATRIC) - ASU DC SPECIAL INSTRUCTIONSFT
ACTIVITY: Full activity, including driving next day as tolerated. No vigorous exercise. Wear bra as desired or tolerated.  BATHING: Remove outer bandage next day. Shower allowed. Pat wound dry.  MEDICATIONS: You may take one or two of the prescribed pain pills every four hours as needed. The discomfort should improve steadily. Switch to Extra-Strength Tylenol or similar medication (aspirin or ibuprofen) as needed.   BLEEDING: After surgery, some blood may escape from under the tape. It is of no consequence. The paper tape may fall off after several days. If not, Dr. Butt will remove it in her office.  BRUISING: As the days go by, black and blue areas may become more noticeable. These areas will disappear within several weeks. In addition, the area around the incision may feel very hard and look swollen. It is normal and it may take several months to subside.     CALL OFFICE:  1) If brisk bleeding is occurring through several new bandages  2) If you breast becomes warm or red, with or without pus  3) If you have a fever greater than 101F    FOLLOW UP: Please call Dr. Butt's office and make an appointment for approximately one week from now. The number is: [phone]

## 2020-12-02 ENCOUNTER — TRANSCRIPTION ENCOUNTER (OUTPATIENT)
Age: 70
End: 2020-12-02

## 2020-12-03 LAB — SURGICAL PATHOLOGY STUDY: SIGNIFICANT CHANGE UP

## 2020-12-07 ENCOUNTER — APPOINTMENT (OUTPATIENT)
Dept: SURGERY | Facility: CLINIC | Age: 70
End: 2020-12-07
Payer: MEDICARE

## 2020-12-07 PROCEDURE — 99024 POSTOP FOLLOW-UP VISIT: CPT

## 2021-02-11 ENCOUNTER — TRANSCRIPTION ENCOUNTER (OUTPATIENT)
Age: 71
End: 2021-02-11

## 2021-02-12 ENCOUNTER — TRANSCRIPTION ENCOUNTER (OUTPATIENT)
Age: 71
End: 2021-02-12

## 2021-02-18 ENCOUNTER — TRANSCRIPTION ENCOUNTER (OUTPATIENT)
Age: 71
End: 2021-02-18

## 2021-02-18 RX ORDER — PANTOPRAZOLE 40 MG/1
40 TABLET, DELAYED RELEASE ORAL
Qty: 90 | Refills: 3 | Status: DISCONTINUED | COMMUNITY
Start: 2018-01-26 | End: 2021-02-18

## 2021-02-18 RX ORDER — PANTOPRAZOLE 40 MG/1
40 TABLET, DELAYED RELEASE ORAL
Qty: 90 | Refills: 3 | Status: DISCONTINUED | COMMUNITY
Start: 2019-09-05 | End: 2021-02-18

## 2021-02-22 ENCOUNTER — TRANSCRIPTION ENCOUNTER (OUTPATIENT)
Age: 71
End: 2021-02-22

## 2021-03-08 ENCOUNTER — TRANSCRIPTION ENCOUNTER (OUTPATIENT)
Age: 71
End: 2021-03-08

## 2021-03-15 ENCOUNTER — TRANSCRIPTION ENCOUNTER (OUTPATIENT)
Age: 71
End: 2021-03-15

## 2021-03-15 ENCOUNTER — NON-APPOINTMENT (OUTPATIENT)
Age: 71
End: 2021-03-15

## 2021-03-19 ENCOUNTER — TRANSCRIPTION ENCOUNTER (OUTPATIENT)
Age: 71
End: 2021-03-19

## 2021-03-22 ENCOUNTER — TRANSCRIPTION ENCOUNTER (OUTPATIENT)
Age: 71
End: 2021-03-22

## 2021-04-26 ENCOUNTER — TRANSCRIPTION ENCOUNTER (OUTPATIENT)
Age: 71
End: 2021-04-26

## 2021-06-25 ENCOUNTER — NON-APPOINTMENT (OUTPATIENT)
Age: 71
End: 2021-06-25

## 2021-07-30 ENCOUNTER — TRANSCRIPTION ENCOUNTER (OUTPATIENT)
Age: 71
End: 2021-07-30

## 2021-08-02 ENCOUNTER — TRANSCRIPTION ENCOUNTER (OUTPATIENT)
Age: 71
End: 2021-08-02

## 2021-09-17 ENCOUNTER — APPOINTMENT (OUTPATIENT)
Dept: INTERNAL MEDICINE | Facility: CLINIC | Age: 71
End: 2021-09-17
Payer: MEDICARE

## 2021-09-17 VITALS
HEART RATE: 84 BPM | TEMPERATURE: 97.6 F | HEIGHT: 61 IN | BODY MASS INDEX: 35.12 KG/M2 | SYSTOLIC BLOOD PRESSURE: 127 MMHG | WEIGHT: 186 LBS | DIASTOLIC BLOOD PRESSURE: 77 MMHG | OXYGEN SATURATION: 97 %

## 2021-09-17 DIAGNOSIS — R92.8 OTHER ABNORMAL AND INCONCLUSIVE FINDINGS ON DIAGNOSTIC IMAGING OF BREAST: ICD-10-CM

## 2021-09-17 PROCEDURE — 90662 IIV NO PRSV INCREASED AG IM: CPT

## 2021-09-17 PROCEDURE — G0439: CPT

## 2021-09-17 PROCEDURE — G0008: CPT

## 2021-09-17 NOTE — PHYSICAL EXAM
[No Acute Distress] : no acute distress [Well Nourished] : well nourished [Well-Appearing] : well-appearing [Well Developed] : well developed [Normal Sclera/Conjunctiva] : normal sclera/conjunctiva [PERRL] : pupils equal round and reactive to light [EOMI] : extraocular movements intact [Normal Outer Ear/Nose] : the outer ears and nose were normal in appearance [Normal Oropharynx] : the oropharynx was normal [No JVD] : no jugular venous distention [No Lymphadenopathy] : no lymphadenopathy [Supple] : supple [Thyroid Normal, No Nodules] : the thyroid was normal and there were no nodules present [No Accessory Muscle Use] : no accessory muscle use [No Respiratory Distress] : no respiratory distress  [Clear to Auscultation] : lungs were clear to auscultation bilaterally [Normal Rate] : normal rate  [Regular Rhythm] : with a regular rhythm [No Murmur] : no murmur heard [Normal S1, S2] : normal S1 and S2 [No Carotid Bruits] : no carotid bruits [No Abdominal Bruit] : a ~M bruit was not heard ~T in the abdomen [No Varicosities] : no varicosities [Pedal Pulses Present] : the pedal pulses are present [No Edema] : there was no peripheral edema [No Palpable Aorta] : no palpable aorta [No Extremity Clubbing/Cyanosis] : no extremity clubbing/cyanosis [Normal Appearance] : normal in appearance [No Axillary Lymphadenopathy] : no axillary lymphadenopathy [Soft] : abdomen soft [Non Tender] : non-tender [Non-distended] : non-distended [No Masses] : no abdominal mass palpated [No HSM] : no HSM [Normal Bowel Sounds] : normal bowel sounds [Normal Posterior Cervical Nodes] : no posterior cervical lymphadenopathy [Normal Anterior Cervical Nodes] : no anterior cervical lymphadenopathy [No CVA Tenderness] : no CVA  tenderness [No Spinal Tenderness] : no spinal tenderness [No Joint Swelling] : no joint swelling [Grossly Normal Strength/Tone] : grossly normal strength/tone [No Rash] : no rash [No Focal Deficits] : no focal deficits [Coordination Grossly Intact] : coordination grossly intact [Normal Gait] : normal gait [Deep Tendon Reflexes (DTR)] : deep tendon reflexes were 2+ and symmetric [Normal Affect] : the affect was normal [Normal Insight/Judgement] : insight and judgment were intact

## 2021-09-17 NOTE — HEALTH RISK ASSESSMENT
[Good] : ~his/her~  mood as  good [No] : No [0] : 2) Feeling down, depressed, or hopeless: Not at all (0) [Patient reported mammogram was normal] : Patient reported mammogram was normal [Patient reported bone density results were normal] : Patient reported bone density results were normal [Patient reported colonoscopy was normal] : Patient reported colonoscopy was normal [HIV test declined] : HIV test declined [Hepatitis C test declined] : Hepatitis C test declined [None] : None [With Significant Other] : lives with significant other [Retired] : retired [Reports changes in vision] : Reports changes in vision [Smoke Detector] : smoke detector [Carbon Monoxide Detector] : carbon monoxide detector [Safety elements used in home] : safety elements used in home [Seat Belt] :  uses seat belt [Sunscreen] : uses sunscreen [With Patient/Caregiver] : , with patient/caregiver [Name: ___] : Health Care Proxy's Name: [unfilled]  [Relationship: ___] : Relationship: [unfilled] [] : No [de-identified] : gym twice a week [de-identified] : healthy, not veg [Change in mental status noted] : No change in mental status noted [Reports changes in hearing] : Reports no changes in hearing [Reports changes in dental health] : Reports no changes in dental health [MammogramDate] : 12/20 [BoneDensityDate] : 09/19 [ColonoscopyDate] : 11/19 [de-identified] : seeing optho next mo [AdvancecareDate] : 09/17/21 [FreeTextEntry4] : no heroics if in vegetative state

## 2021-09-17 NOTE — HISTORY OF PRESENT ILLNESS
[FreeTextEntry1] : cpe [de-identified] : 69 yo woman w hypothy, obesity, preDM, HLD, gerd, fibrocystic breasts s/p mult biopsies\par 12/20 biopsy/excision Dr. Butt, Intraductal Papilloma, apocrine metaplasia, sclerosing adnosis.\par  Has Mammo/US sched for next mo then will f/u Mauro Butt.\par No f/h breast ca.\par \par Tested positive for Covid 2/21 after first dose Covid vax; mild-no sxs. 2nd vax 3/24/21.\par \par Gerd: was able to lower pantop to 20 mg, can't d/c altogether tho.\par Gyn Dr. Nneka hidalgo appointmt upcoming\par

## 2021-09-17 NOTE — ASSESSMENT
[FreeTextEntry1] : Pt is stable.\par Mammo/Us next mo, then f/u w Dr. Butt.\par I advised consultation w Breast Oncology- Dr. Farias or colleague, to see if candidate for prophylactic Tamox.\par \par Flu shot\par Routine labs\par Dexa\par \par Wt loss strategies.

## 2021-09-20 LAB
25(OH)D3 SERPL-MCNC: 54.5 NG/ML
ALBUMIN SERPL ELPH-MCNC: 4.8 G/DL
ALP BLD-CCNC: 66 U/L
ALT SERPL-CCNC: 27 U/L
ANION GAP SERPL CALC-SCNC: 15 MMOL/L
AST SERPL-CCNC: 21 U/L
BASOPHILS # BLD AUTO: 0.08 K/UL
BASOPHILS NFR BLD AUTO: 1.4 %
BILIRUB SERPL-MCNC: 0.6 MG/DL
BUN SERPL-MCNC: 14 MG/DL
CALCIUM SERPL-MCNC: 9.8 MG/DL
CHLORIDE SERPL-SCNC: 104 MMOL/L
CHOLEST SERPL-MCNC: 212 MG/DL
CO2 SERPL-SCNC: 23 MMOL/L
CREAT SERPL-MCNC: 0.66 MG/DL
EOSINOPHIL # BLD AUTO: 0.12 K/UL
EOSINOPHIL NFR BLD AUTO: 2.1 %
ESTIMATED AVERAGE GLUCOSE: 128 MG/DL
GLUCOSE SERPL-MCNC: 116 MG/DL
HBA1C MFR BLD HPLC: 6.1 %
HCT VFR BLD CALC: 44.1 %
HDLC SERPL-MCNC: 88 MG/DL
HGB BLD-MCNC: 14.7 G/DL
IMM GRANULOCYTES NFR BLD AUTO: 0.2 %
LDLC SERPL CALC-MCNC: 105 MG/DL
LYMPHOCYTES # BLD AUTO: 1.51 K/UL
LYMPHOCYTES NFR BLD AUTO: 26.5 %
MAN DIFF?: NORMAL
MCHC RBC-ENTMCNC: 29.5 PG
MCHC RBC-ENTMCNC: 33.3 GM/DL
MCV RBC AUTO: 88.4 FL
MONOCYTES # BLD AUTO: 0.51 K/UL
MONOCYTES NFR BLD AUTO: 9 %
NEUTROPHILS # BLD AUTO: 3.46 K/UL
NEUTROPHILS NFR BLD AUTO: 60.8 %
NONHDLC SERPL-MCNC: 124 MG/DL
PLATELET # BLD AUTO: 308 K/UL
POTASSIUM SERPL-SCNC: 4.3 MMOL/L
PROT SERPL-MCNC: 6.6 G/DL
RBC # BLD: 4.99 M/UL
RBC # FLD: 14.3 %
SODIUM SERPL-SCNC: 141 MMOL/L
TRIGL SERPL-MCNC: 96 MG/DL
TSH SERPL-ACNC: 1.87 UIU/ML
WBC # FLD AUTO: 5.69 K/UL

## 2021-09-28 ENCOUNTER — RESULT REVIEW (OUTPATIENT)
Age: 71
End: 2021-09-28

## 2021-09-28 ENCOUNTER — APPOINTMENT (OUTPATIENT)
Dept: OBGYN | Facility: CLINIC | Age: 71
End: 2021-09-28
Payer: MEDICARE

## 2021-09-28 VITALS
WEIGHT: 184 LBS | SYSTOLIC BLOOD PRESSURE: 152 MMHG | DIASTOLIC BLOOD PRESSURE: 87 MMHG | TEMPERATURE: 96.8 F | BODY MASS INDEX: 34.74 KG/M2 | HEIGHT: 61 IN | HEART RATE: 86 BPM

## 2021-09-28 DIAGNOSIS — Z98.890 OTHER SPECIFIED POSTPROCEDURAL STATES: ICD-10-CM

## 2021-09-28 DIAGNOSIS — Z01.419 ENCOUNTER FOR GYNECOLOGICAL EXAMINATION (GENERAL) (ROUTINE) W/OUT ABNORMAL FINDINGS: ICD-10-CM

## 2021-09-28 PROCEDURE — G0101: CPT

## 2021-09-28 RX ORDER — PANTOPRAZOLE 40 MG/1
40 TABLET, DELAYED RELEASE ORAL
Qty: 90 | Refills: 3 | Status: COMPLETED | COMMUNITY
Start: 2020-09-19 | End: 2021-09-28

## 2021-10-14 NOTE — ASU PATIENT PROFILE, ADULT - IS PATIENT PREGNANT?
[facility] referral sent to [facility]. [facility] Danyelle Christy RN BSN  ICU/MS-2 Inpatient    no

## 2021-10-22 ENCOUNTER — APPOINTMENT (OUTPATIENT)
Dept: MAMMOGRAPHY | Facility: IMAGING CENTER | Age: 71
End: 2021-10-22
Payer: MEDICARE

## 2021-10-22 ENCOUNTER — RESULT REVIEW (OUTPATIENT)
Age: 71
End: 2021-10-22

## 2021-10-22 ENCOUNTER — OUTPATIENT (OUTPATIENT)
Dept: OUTPATIENT SERVICES | Facility: HOSPITAL | Age: 71
LOS: 1 days | End: 2021-10-22
Payer: MEDICARE

## 2021-10-22 ENCOUNTER — APPOINTMENT (OUTPATIENT)
Dept: RADIOLOGY | Facility: IMAGING CENTER | Age: 71
End: 2021-10-22
Payer: MEDICARE

## 2021-10-22 ENCOUNTER — APPOINTMENT (OUTPATIENT)
Dept: ULTRASOUND IMAGING | Facility: IMAGING CENTER | Age: 71
End: 2021-10-22
Payer: MEDICARE

## 2021-10-22 DIAGNOSIS — Z01.419 ENCOUNTER FOR GYNECOLOGICAL EXAMINATION (GENERAL) (ROUTINE) WITHOUT ABNORMAL FINDINGS: ICD-10-CM

## 2021-10-22 DIAGNOSIS — Z98.890 OTHER SPECIFIED POSTPROCEDURAL STATES: Chronic | ICD-10-CM

## 2021-10-22 DIAGNOSIS — Z96.642 PRESENCE OF LEFT ARTIFICIAL HIP JOINT: Chronic | ICD-10-CM

## 2021-10-22 DIAGNOSIS — Z00.00 ENCOUNTER FOR GENERAL ADULT MEDICAL EXAMINATION WITHOUT ABNORMAL FINDINGS: ICD-10-CM

## 2021-10-22 DIAGNOSIS — Z98.89 OTHER SPECIFIED POSTPROCEDURAL STATES: Chronic | ICD-10-CM

## 2021-10-22 PROCEDURE — G0279: CPT

## 2021-10-22 PROCEDURE — 77080 DXA BONE DENSITY AXIAL: CPT | Mod: 26

## 2021-10-22 PROCEDURE — 76641 ULTRASOUND BREAST COMPLETE: CPT | Mod: 26,50

## 2021-10-22 PROCEDURE — 76641 ULTRASOUND BREAST COMPLETE: CPT

## 2021-10-22 PROCEDURE — 77080 DXA BONE DENSITY AXIAL: CPT

## 2021-10-22 PROCEDURE — 77066 DX MAMMO INCL CAD BI: CPT

## 2021-10-22 PROCEDURE — 77066 DX MAMMO INCL CAD BI: CPT | Mod: 26

## 2021-10-22 PROCEDURE — G0279: CPT | Mod: 26

## 2021-10-24 LAB
CYTOLOGY CVX/VAG DOC THIN PREP: NORMAL
HPV HIGH+LOW RISK DNA PNL CVX: NOT DETECTED

## 2021-11-15 ENCOUNTER — NON-APPOINTMENT (OUTPATIENT)
Age: 71
End: 2021-11-15

## 2021-11-16 ENCOUNTER — APPOINTMENT (OUTPATIENT)
Dept: INTERNAL MEDICINE | Facility: CLINIC | Age: 71
End: 2021-11-16
Payer: MEDICARE

## 2021-11-16 VITALS
HEIGHT: 61 IN | OXYGEN SATURATION: 99 % | DIASTOLIC BLOOD PRESSURE: 80 MMHG | HEART RATE: 90 BPM | WEIGHT: 178 LBS | RESPIRATION RATE: 17 BRPM | SYSTOLIC BLOOD PRESSURE: 144 MMHG | TEMPERATURE: 98.2 F | BODY MASS INDEX: 33.61 KG/M2

## 2021-11-16 DIAGNOSIS — J04.0 ACUTE LARYNGITIS: ICD-10-CM

## 2021-11-16 PROCEDURE — 99213 OFFICE O/P EST LOW 20 MIN: CPT

## 2021-11-16 NOTE — HISTORY OF PRESENT ILLNESS
[FreeTextEntry8] : hoarse voice x 3 wks, no sore thr, no cough, no fever/chills or malaise. No singing/screaming/ no smoking anything.\par No gerd. Husb was sick w URI before this started. \par Feeling very frustrated\par Covid neg pcr last wk\par Had booster 10/5/21. \par

## 2021-11-16 NOTE — ASSESSMENT
[FreeTextEntry1] : Pt w laryngitis x 3 wks, most likely viral.\par Will send in Medrol dosepak and refer to ENT for completeness.

## 2021-11-16 NOTE — PHYSICAL EXAM
[No Acute Distress] : no acute distress [Normal Oropharynx] : the oropharynx was normal [Normal TMs] : both tympanic membranes were normal [Supple] : supple [Clear to Auscultation] : lungs were clear to auscultation bilaterally [Normal S1, S2] : normal S1 and S2 [Normal] : no posterior cervical lymphadenopathy and no anterior cervical lymphadenopathy [de-identified] : hoarse voice

## 2021-11-17 ENCOUNTER — NON-APPOINTMENT (OUTPATIENT)
Age: 71
End: 2021-11-17

## 2021-11-17 ENCOUNTER — TRANSCRIPTION ENCOUNTER (OUTPATIENT)
Age: 71
End: 2021-11-17

## 2021-11-18 ENCOUNTER — TRANSCRIPTION ENCOUNTER (OUTPATIENT)
Age: 71
End: 2021-11-18

## 2021-11-18 ENCOUNTER — APPOINTMENT (OUTPATIENT)
Dept: OTOLARYNGOLOGY | Facility: CLINIC | Age: 71
End: 2021-11-18
Payer: MEDICARE

## 2021-11-18 VITALS
TEMPERATURE: 97.7 F | BODY MASS INDEX: 33.61 KG/M2 | SYSTOLIC BLOOD PRESSURE: 154 MMHG | HEIGHT: 61 IN | WEIGHT: 178 LBS | HEART RATE: 78 BPM | DIASTOLIC BLOOD PRESSURE: 80 MMHG

## 2021-11-18 DIAGNOSIS — R49.0 DYSPHONIA: ICD-10-CM

## 2021-11-18 DIAGNOSIS — K21.9 GASTRO-ESOPHAGEAL REFLUX DISEASE W/OUT ESOPHAGITIS: ICD-10-CM

## 2021-11-18 PROCEDURE — 99204 OFFICE O/P NEW MOD 45 MIN: CPT | Mod: 25

## 2021-11-18 PROCEDURE — 31575 DIAGNOSTIC LARYNGOSCOPY: CPT

## 2021-11-18 RX ORDER — FLUTICASONE PROPIONATE 50 UG/1
50 SPRAY, METERED NASAL DAILY
Qty: 1 | Refills: 2 | Status: ACTIVE | COMMUNITY
Start: 2021-11-18 | End: 1900-01-01

## 2021-11-18 NOTE — ASSESSMENT
[FreeTextEntry1] : Patient with a history of 3 weeks of voice change consistent with a laryngitis she does have reflux is on omeprazole here for evaluation she was prescribed a Medrol Dosepak was told to hold off until she was examined fiberoptic evaluation of the larynx revealed evidence of edema of arytenoids no tumors masses or polyps some erythema of her vocal cords consistent with a laryngitis that see the self-limiting will recover quickly with the Medrol Dosepak I have encouraged her to go ahead with the Medrol Dosepak as well as use Flonase nasal spray she was also told she has a deviated nasal septum in her nasal cavity no further acute interventions indicated she will follow-up with us if her voice has not returned to her baseline normal.

## 2021-11-18 NOTE — HISTORY OF PRESENT ILLNESS
[de-identified] : Patient lost her voice about three weeks ago and it has been intermittently raspy since then. She does not have any pain in the throat and is not having any issues with eating, drinking or swallowing. She has not been doing any excessive screaming or yelling. She was given a medrol pack a few days ago by her PCP but has not started it yet. She denies nasal congestion or runny nose. SHe has reflux and takes medication for this

## 2021-11-18 NOTE — END OF VISIT
[Resident] : Resident [FreeTextEntry3] : I saw and examined this patient in person. I have discussed with Stephany Hector, Physician Assistant, in detail the above note and agree with the above assessment and plan of care.\par

## 2021-11-26 ENCOUNTER — APPOINTMENT (OUTPATIENT)
Dept: GASTROENTEROLOGY | Facility: CLINIC | Age: 71
End: 2021-11-26
Payer: MEDICARE

## 2021-11-26 VITALS
TEMPERATURE: 97 F | DIASTOLIC BLOOD PRESSURE: 80 MMHG | HEIGHT: 61 IN | WEIGHT: 176 LBS | BODY MASS INDEX: 33.23 KG/M2 | HEART RATE: 83 BPM | SYSTOLIC BLOOD PRESSURE: 150 MMHG | OXYGEN SATURATION: 97 %

## 2021-11-26 PROCEDURE — 99213 OFFICE O/P EST LOW 20 MIN: CPT

## 2021-11-26 NOTE — ASSESSMENT
[FreeTextEntry1] : Impression:\par \par #1 GERD- EGD on 1/25/18 noted for 3 cm hiatus hernia association with LA grade C erosive esophagitis. Davila's esophagus not detected. EGD on 11/21/19 was noted for normal esophagus with detection of a 2 cm hiatus hernia (Hill grade 2-3) without detection of erosive esophagitis or Davila's esophagus. Patient has derived an excellent symptomatic response to current regimen of pantoprazole 40 mg daily with resolution of heartburn and dysphagia. Continues to experience very good response to antisecretory therapy without any complaints of breakthrough heartburn, regurgitation or typical GERD symptoms.\par \par #2 hoarseness–altered vocal quality and hoarseness of 1 month duration with laryngitis-like symptoms.  Question as to possible GERD exacerbated symptoms with component of LPR considered.  Possible but not definite.  Already indicates approximate 75% symptom reduction following Medrol Dosepak and Flonase.\par \par #3 history of colonic adenomatous polyps-status post removal of a 1 cm sessile rectosigmoid junction polyp with high-grade dysplasia in 2004 and status post removal of a 3 mm hepatic flexure adenoma, 4 mm proximal transverse colon hyperplastic polyp and 3 mm distal sigmoid adenoma at the 12/24/14 colonoscopy. Colonoscopy on 11/21/2019 noted removal of a 3 mm diminutive cecal adenoma.\par \par #4 history of infectious mononucleosis hepatitis in 2004.\par \par #5 obesity- BMI 33.26.

## 2021-11-26 NOTE — HISTORY OF PRESENT ILLNESS
[FreeTextEntry1] : Office revisit on 11/26/2021.\par \par Patient presents for followup regarding treatment of  GERD.\par \par The patient was previously evaluated for this consultation on 10/29/14.\par \par INITIAL CONSULTATION NOTE:\par \par Office consultation on 10/29/14. The patient is a 64-year-old woman who is being evaluated for a surveillance colonoscopy\par \par The patient is currently asymptomatic from a gastrointestinal standpoint. She has one formed bowel movement daily without any complaints of diarrhea, constipation, change in bowel habit or rectal bleeding. Her appetite and weight are stable. She reports no complaints of dysphagia, heartburn, nausea, vomiting or abdominal pain.\par \par The patient previously experienced irregular bowel movements. She could have a bowel movement every day but then may not move her bowels for 2 or 3 days. However, since starting a regimen of yogurt with either Greek yogurt or Activia she is having regular daily bowel movements without any irregularity or difficulty.\par \par In 2004 the patient underwent a colonoscopy at which time a 1cm sessile polyp at the rectosigmoid junction with high-grade dysplasia was removed. The patient underwent colonoscopy examinations in 2005, 2006 and 2008 at which time there were no neoplastic polyps removed. A few hyperplastic polyps were removed.\par \par The patient's last colonoscopy was on 4/27/10 at which time melanosis coli was noted. Polyps were not detected at that examination.\par \par COLONOSCOPY 12/31/14:    -The patient underwent a surveillance colonoscopy on 12/22/14. A total colonoscopy was performed to the cecum with ileoscopy. A 3 mm hepatic flexure adenoma, 4 mm proximal transverse colon hyperplastic polyp and a 3 mm distal sigmoid adenoma were removed. A followup surveillance colonoscopy is advised in 5 years. \par \par CURRENT HISTORY:\par \par ORV 12/22/17:    -Patient presented with complaint of heartburn of one year duration. Approximately one-year symptoms but with increase in severity over the past few months. Heartburn described as retrosternal burning. Mostly daytime symptoms and triggered by oral intake such as water and yogurt. Paradoxically fried and fatty food is less provocative. Experiences approximately 3-4 episodes per week. Gets some relief with Zantac 150 mg. Experiences sense of dysphagia with slow bolus passage both to liquids and solids. This is mild and intermittent.\par             -Appetite and weight are stable. Patient has one formed bowel movement daily without any complaints of diarrhea, constipation, change in bowel habit or rectal bleeding.\par               -Patient complains of postnasal drip with associated throat eructation and cough. Denies dyspnea, wheezing, chest pain, sinus congestion, hoarseness or focal change. Patient indicates that postnasal drip and cough and sometimes provoke nonbloody vomiting.\par \par EGD 1/25/18:    -EGD was performed on 1/25/18 to evaluate GERD symptoms and dysphagia. Examination of the esophagus revealed a 3 cm hiatus hernia in association with LA grade C erosive esophagitis. Distal esophagus biopsy revealed active esophagitis with ulcer consistent with reflux esophagitis. Dysplasia or any evidence of fungi or viral cytopathic change was not detected. Examination of the stomach was normal. Gastric antrum biopsy revealed a nonspecific reactive gastropathy. Testing was negative for Helicobacter pylori. The visualized duodenum to the second portion was normal.\par \par ORV 5/4/18:    -Patient presents for followup regarding recent diagnosis of erosive esophagitis. Currently doing very well on a regimen of pantoprazole 40 mg daily. No complaints of breakthrough heartburn, regurgitation or dysphagia.  Denies nausea, vomiting, abdominal pain or other GI symptoms.\par \par ORV 11/7/18:    -Patient presents for followup of history of GERD. Reports doing very well on current regimen of maintenance pantoprazole 40 mg q.d. No complaint of any breakthrough heartburn, regurgitation, nausea or vomiting. Appetite and weight are stable. Denies any complaints of abdominal pain, diarrhea, constipation, change in bowel habit or rectal bleeding.\par            -Patient underwent left THR 7/2018.\par \par ORV 5/15/19:    -Patient presents for followup regarding history of GERD. In addition, planned for surveillance colonoscopy. Currently doing very well on maintenance pantoprazole 40 mg q.d. Appetite and weight are stable. Denies any complaints of breakthrough heartburn, regurgitation or other reflux symptoms. Denies dysphagia, nausea, vomiting, abdominal pain, diarrhea, constipation, change in bowel habit or rectal bleeding.\par \par COLONOSCOPY 11/21/2019:       -Surveillance colonoscopy was performed on 11/21/19. Patient previously had removal of a colonic adenoma with high-grade dysplasia. Total colonoscopy was performed to the cecum with ileoscopy. Normal terminal ileum. A 3 mm diminutive cecal adenoma was removed. The colonoscopy examination was otherwise normal. A followup surveillance colonoscopy is advised in 3 years.\par \par EGD 11/21/2019:     -EGD was performed to evaluate a history of erosive GERD. Examination of the esophagus was normal except for a 2 cm hiatus hernia (Hill grade 2-3). There was no erosive esophagitis or Davila's esophagus. Biopsy of the esophagus was normal. Examination of the stomach was normal except for a few gastric body fundic gland polyps. Gastric biopsy was negative for Helicobacter pylori. Gastric intestinal metaplasia was not detected. Visualized duodenum to the second portion was normal. Patient was advised to continue current regimen of antisecretory therapy. \par \par ORV 7/15/2020:     -Presents for followup regarding history of GERD. Currently asymptomatic on pantoprazole 40 mg daily. Denies any complaints of breakthrough heartburn. Appetite is stable and may have gained a few pounds. No complaints of dysphagia, heartburn, nausea, vomiting or abdominal pain. Has daily formed bowel movements without report of diarrhea, constipation, change in bowel habit or rectal bleeding.\par \par ORV 11/26/2021:     -Evaluated for follow-up regarding GERD.  Indicates that 1 month ago he had sudden onset of "laryngitis with hoarseness and altered vocal quality.  Has had similar spontaneous episodes in the past but typically would last several days and spontaneously resolve.  Protracted symptoms ultimately prompted evaluation by ENT on 11/18/2021 prompting therapy with Medrol Dosepak and Flonase.  Fiberoptic laryngoscopy findings as per ENT note indicates edema of arytenoids and some erythema vocal cords.  Patient indicates that discussion with ENT consultant question whether GERD could be contributory.  Significantly improved at current time with approximate 75% reduction in laryngitis-like symptoms.\par                                   -Otherwise, reports feeling well.  At no point in association with laryngitis symptoms was experiencing heartburn, regurgitation or typical GERD symptoms.  Appetite and weight are stable.  Denies complaints of dysphagia, heartburn, regurgitation, nausea, vomiting or abdominal pain.  Has daily formed bowel movements without any report of diarrhea, constipation, change in bowel habit or rectal bleeding.  Currently on maintenance regimen of pantoprazole 20 mg daily which has controlled her typical GERD symptoms very well.

## 2021-11-26 NOTE — PHYSICAL EXAM
[General Appearance - Alert] : alert [General Appearance - In No Acute Distress] : in no acute distress [General Appearance - Well Developed] : well developed [General Appearance - Well-Appearing] : healthy appearing [Sclera] : the sclera and conjunctiva were normal [Neck Appearance] : the appearance of the neck was normal [Neck Cervical Mass (___cm)] : no neck mass was observed [Respiration, Rhythm And Depth] : normal respiratory rhythm and effort [Exaggerated Use Of Accessory Muscles For Inspiration] : no accessory muscle use [Auscultation Breath Sounds / Voice Sounds] : lungs were clear to auscultation bilaterally [Heart Rate And Rhythm] : heart rate was normal and rhythm regular [Heart Sounds] : normal S1 and S2 [Heart Sounds Gallop] : no gallops [Murmurs] : no murmurs [Heart Sounds Pericardial Friction Rub] : no pericardial rub [Edema] : there was no peripheral edema [Abdomen Soft] : soft [Abdomen Tenderness] : non-tender [] : no hepato-splenomegaly [Abdomen Mass (___ Cm)] : no abdominal mass palpated [Abdomen Hernia] : no hernia was discovered [Cervical Lymph Nodes Enlarged Posterior Bilaterally] : posterior cervical [Cervical Lymph Nodes Enlarged Anterior Bilaterally] : anterior cervical [Supraclavicular Lymph Nodes Enlarged Bilaterally] : supraclavicular [Abnormal Walk] : normal gait [Nail Clubbing] : no clubbing  or cyanosis of the fingernails [Skin Color & Pigmentation] : normal skin color and pigmentation [Oriented To Time, Place, And Person] : oriented to person, place, and time [Impaired Insight] : insight and judgment were intact [Affect] : the affect was normal [Mood] : the mood was normal [FreeTextEntry1] : The abdomen is mildly obese, soft, nontender, nondistended and without mass or hepatosplenomegaly.

## 2021-11-26 NOTE — REASON FOR VISIT
[Follow-Up: _____] : a [unfilled] follow-up visit [Spouse] : spouse [FreeTextEntry1] : for followup of GERD.

## 2021-11-26 NOTE — CONSULT LETTER
[Dear  ___] : Dear  [unfilled], [Please see my note below.] : Please see my note below. [Consult Closing:] : Thank you very much for allowing me to participate in the care of this patient.  If you have any questions, please do not hesitate to contact me. [Sincerely,] : Sincerely, [FreeTextEntry2] : Dr. Elinor Barahona [FreeTextEntry3] : Zak Suero M.D.

## 2021-12-01 ENCOUNTER — TRANSCRIPTION ENCOUNTER (OUTPATIENT)
Age: 71
End: 2021-12-01

## 2021-12-01 DIAGNOSIS — J06.9 ACUTE UPPER RESPIRATORY INFECTION, UNSPECIFIED: ICD-10-CM

## 2021-12-02 ENCOUNTER — TRANSCRIPTION ENCOUNTER (OUTPATIENT)
Age: 71
End: 2021-12-02

## 2022-01-24 ENCOUNTER — TRANSCRIPTION ENCOUNTER (OUTPATIENT)
Age: 72
End: 2022-01-24

## 2022-04-21 ENCOUNTER — TRANSCRIPTION ENCOUNTER (OUTPATIENT)
Age: 72
End: 2022-04-21

## 2022-04-22 ENCOUNTER — TRANSCRIPTION ENCOUNTER (OUTPATIENT)
Age: 72
End: 2022-04-22

## 2022-05-13 ENCOUNTER — TRANSCRIPTION ENCOUNTER (OUTPATIENT)
Age: 72
End: 2022-05-13

## 2022-06-03 ENCOUNTER — RESULT REVIEW (OUTPATIENT)
Age: 72
End: 2022-06-03

## 2022-06-03 ENCOUNTER — TRANSCRIPTION ENCOUNTER (OUTPATIENT)
Age: 72
End: 2022-06-03

## 2022-08-06 ENCOUNTER — NON-APPOINTMENT (OUTPATIENT)
Age: 72
End: 2022-08-06

## 2022-08-26 ENCOUNTER — TRANSCRIPTION ENCOUNTER (OUTPATIENT)
Age: 72
End: 2022-08-26

## 2022-08-29 ENCOUNTER — TRANSCRIPTION ENCOUNTER (OUTPATIENT)
Age: 72
End: 2022-08-29

## 2022-09-27 ENCOUNTER — APPOINTMENT (OUTPATIENT)
Dept: INTERNAL MEDICINE | Facility: CLINIC | Age: 72
End: 2022-09-27

## 2022-09-27 VITALS
SYSTOLIC BLOOD PRESSURE: 157 MMHG | HEIGHT: 60 IN | OXYGEN SATURATION: 98 % | DIASTOLIC BLOOD PRESSURE: 78 MMHG | TEMPERATURE: 97.9 F | WEIGHT: 183 LBS | HEART RATE: 89 BPM | BODY MASS INDEX: 35.93 KG/M2

## 2022-09-27 DIAGNOSIS — E03.9 HYPOTHYROIDISM, UNSPECIFIED: ICD-10-CM

## 2022-09-27 DIAGNOSIS — R92.2 INCONCLUSIVE MAMMOGRAM: ICD-10-CM

## 2022-09-27 PROCEDURE — G0439: CPT

## 2022-09-27 RX ORDER — AZITHROMYCIN 250 MG/1
250 TABLET, FILM COATED ORAL
Qty: 1 | Refills: 0 | Status: DISCONTINUED | COMMUNITY
Start: 2021-12-01 | End: 2022-09-27

## 2022-09-27 RX ORDER — PANTOPRAZOLE 20 MG/1
20 TABLET, DELAYED RELEASE ORAL DAILY
Qty: 90 | Refills: 0 | Status: DISCONTINUED | COMMUNITY
Start: 2021-02-11 | End: 2022-09-27

## 2022-09-27 RX ORDER — PANTOPRAZOLE 40 MG/1
40 TABLET, DELAYED RELEASE ORAL
Qty: 90 | Refills: 3 | Status: DISCONTINUED | COMMUNITY
Start: 2020-09-17 | End: 2022-09-27

## 2022-09-27 RX ORDER — METHYLPREDNISOLONE 4 MG/1
4 TABLET ORAL
Qty: 1 | Refills: 1 | Status: DISCONTINUED | COMMUNITY
Start: 2021-11-16 | End: 2022-09-27

## 2022-09-27 RX ORDER — PANTOPRAZOLE 20 MG/1
20 TABLET, DELAYED RELEASE ORAL DAILY
Qty: 90 | Refills: 3 | Status: DISCONTINUED | COMMUNITY
Start: 2021-07-30 | End: 2022-09-27

## 2022-09-27 RX ORDER — PANTOPRAZOLE 20 MG/1
20 TABLET, DELAYED RELEASE ORAL DAILY
Qty: 90 | Refills: 1 | Status: DISCONTINUED | COMMUNITY
Start: 2021-02-22 | End: 2022-09-27

## 2022-09-28 ENCOUNTER — TRANSCRIPTION ENCOUNTER (OUTPATIENT)
Age: 72
End: 2022-09-28

## 2022-09-28 VITALS — DIASTOLIC BLOOD PRESSURE: 80 MMHG | SYSTOLIC BLOOD PRESSURE: 140 MMHG

## 2022-09-28 NOTE — HISTORY OF PRESENT ILLNESS
[FreeTextEntry1] : CPE [de-identified] : 70 yo woman w obesity, hypothy, preDM, HLD, gerd, fibrocystic breast/apocr metapl/sclerosis/biopsies \par Elev office BPs pt states always normal at home ~ 130/70.\par DJD s/p THR; [place] polyp\par \par Recent wt gain s/p cruise and headed for 3 wk trip to [place] then holiday season...\par Flu shot: had; shingrix will start after trip\par Covid x 5 vax\par \par \par [place] 11/19 due 5 yrs polyp; EGD 11/19 neg (prior erosive esophagitis)\par \par Mammo/US upcoming appt\par Dexa 10/21 nl\par

## 2022-09-28 NOTE — HEALTH RISK ASSESSMENT
[Good] : ~his/her~  mood as  good [Never] : Never [No] : No [0] : 2) Feeling down, depressed, or hopeless: Not at all (0) [Patient reported mammogram was normal] : Patient reported mammogram was normal [Patient reported PAP Smear was normal] : Patient reported PAP Smear was normal [Patient reported bone density results were normal] : Patient reported bone density results were normal [Patient reported colonoscopy was normal] : Patient reported colonoscopy was normal [Fully functional (bathing, dressing, toileting, transferring, walking, feeding)] : Fully functional (bathing, dressing, toileting, transferring, walking, feeding) [Fully functional (using the telephone, shopping, preparing meals, housekeeping, doing laundry, using] : Fully functional and needs no help or supervision to perform IADLs (using the telephone, shopping, preparing meals, housekeeping, doing laundry, using transportation, managing medications and managing finances) [Smoke Detector] : smoke detector [Safety elements used in home] : safety elements used in home [With Patient/Caregiver] : , with patient/caregiver [Name: ___] : Health Care Proxy's Name: [unfilled]  [Relationship: ___] : Relationship: [unfilled] [de-identified] : gym twice a wk [de-identified] : healthy [Change in mental status noted] : No change in mental status noted [Reports changes in hearing] : Reports no changes in hearing [Reports changes in vision] : Reports no changes in vision [Reports changes in dental health] : Reports no changes in dental health [MammogramDate] : 10/21 [PapSmearDate] : 09/21 [BoneDensityDate] : 10/21 [ColonoscopyDate] : 11/19 [AdvancecareDate] : 09/27/22

## 2022-09-28 NOTE — ASSESSMENT
[FreeTextEntry1] : Pt as outlined.\par BP elev, has gained wt, we discussed salt avoidance, wt loss, exercise. Not motivated at the moment.\par Routine labs\par Mammo/US\par Shingrix after next trip\par f/u 6 mo

## 2022-09-29 ENCOUNTER — TRANSCRIPTION ENCOUNTER (OUTPATIENT)
Age: 72
End: 2022-09-29

## 2022-09-30 ENCOUNTER — TRANSCRIPTION ENCOUNTER (OUTPATIENT)
Age: 72
End: 2022-09-30

## 2022-10-01 LAB
ALBUMIN SERPL ELPH-MCNC: 5 G/DL
ALP BLD-CCNC: 70 U/L
ALT SERPL-CCNC: 39 U/L
ANION GAP SERPL CALC-SCNC: 11 MMOL/L
AST SERPL-CCNC: 26 U/L
BASOPHILS # BLD AUTO: 0.06 K/UL
BASOPHILS NFR BLD AUTO: 0.9 %
BILIRUB SERPL-MCNC: 0.9 MG/DL
BUN SERPL-MCNC: 13 MG/DL
CALCIUM SERPL-MCNC: 10.4 MG/DL
CHLORIDE SERPL-SCNC: 103 MMOL/L
CHOLEST SERPL-MCNC: 217 MG/DL
CO2 SERPL-SCNC: 28 MMOL/L
CREAT SERPL-MCNC: 0.71 MG/DL
EGFR: 92 ML/MIN/1.73M2
EOSINOPHIL # BLD AUTO: 0.12 K/UL
EOSINOPHIL NFR BLD AUTO: 1.8 %
ESTIMATED AVERAGE GLUCOSE: 126 MG/DL
GLUCOSE SERPL-MCNC: 112 MG/DL
HBA1C MFR BLD HPLC: 6 %
HCT VFR BLD CALC: 44.6 %
HDLC SERPL-MCNC: 91 MG/DL
HGB BLD-MCNC: 14.3 G/DL
IMM GRANULOCYTES NFR BLD AUTO: 0.4 %
LDLC SERPL CALC-MCNC: 98 MG/DL
LYMPHOCYTES # BLD AUTO: 1.32 K/UL
LYMPHOCYTES NFR BLD AUTO: 19.4 %
MAN DIFF?: NORMAL
MCHC RBC-ENTMCNC: 28.1 PG
MCHC RBC-ENTMCNC: 32.1 GM/DL
MCV RBC AUTO: 87.6 FL
MONOCYTES # BLD AUTO: 0.61 K/UL
MONOCYTES NFR BLD AUTO: 8.9 %
NEUTROPHILS # BLD AUTO: 4.68 K/UL
NEUTROPHILS NFR BLD AUTO: 68.6 %
NONHDLC SERPL-MCNC: 126 MG/DL
PLATELET # BLD AUTO: 298 K/UL
POTASSIUM SERPL-SCNC: 4.4 MMOL/L
PROT SERPL-MCNC: 6.9 G/DL
RBC # BLD: 5.09 M/UL
RBC # FLD: 14.1 %
SODIUM SERPL-SCNC: 143 MMOL/L
TRIGL SERPL-MCNC: 143 MG/DL
TSH SERPL-ACNC: 1.9 UIU/ML
WBC # FLD AUTO: 6.82 K/UL

## 2022-10-03 ENCOUNTER — TRANSCRIPTION ENCOUNTER (OUTPATIENT)
Age: 72
End: 2022-10-03

## 2022-11-02 ENCOUNTER — RESULT REVIEW (OUTPATIENT)
Age: 72
End: 2022-11-02

## 2022-11-02 ENCOUNTER — APPOINTMENT (OUTPATIENT)
Dept: MAMMOGRAPHY | Facility: IMAGING CENTER | Age: 72
End: 2022-11-02

## 2022-11-02 ENCOUNTER — APPOINTMENT (OUTPATIENT)
Dept: ULTRASOUND IMAGING | Facility: IMAGING CENTER | Age: 72
End: 2022-11-02

## 2022-11-02 ENCOUNTER — OUTPATIENT (OUTPATIENT)
Dept: OUTPATIENT SERVICES | Facility: HOSPITAL | Age: 72
LOS: 1 days | End: 2022-11-02
Payer: MEDICARE

## 2022-11-02 DIAGNOSIS — R92.8 OTHER ABNORMAL AND INCONCLUSIVE FINDINGS ON DIAGNOSTIC IMAGING OF BREAST: ICD-10-CM

## 2022-11-02 DIAGNOSIS — Z96.642 PRESENCE OF LEFT ARTIFICIAL HIP JOINT: Chronic | ICD-10-CM

## 2022-11-02 DIAGNOSIS — Z98.890 OTHER SPECIFIED POSTPROCEDURAL STATES: Chronic | ICD-10-CM

## 2022-11-02 DIAGNOSIS — Z98.890 OTHER SPECIFIED POSTPROCEDURAL STATES: ICD-10-CM

## 2022-11-02 DIAGNOSIS — Z98.89 OTHER SPECIFIED POSTPROCEDURAL STATES: Chronic | ICD-10-CM

## 2022-11-02 PROCEDURE — 77063 BREAST TOMOSYNTHESIS BI: CPT | Mod: 26

## 2022-11-02 PROCEDURE — 76641 ULTRASOUND BREAST COMPLETE: CPT | Mod: 26,50

## 2022-11-02 PROCEDURE — 76641 ULTRASOUND BREAST COMPLETE: CPT

## 2022-11-02 PROCEDURE — 77067 SCR MAMMO BI INCL CAD: CPT | Mod: 26

## 2022-11-02 PROCEDURE — 77067 SCR MAMMO BI INCL CAD: CPT

## 2022-11-02 PROCEDURE — 77063 BREAST TOMOSYNTHESIS BI: CPT

## 2023-03-10 ENCOUNTER — APPOINTMENT (OUTPATIENT)
Dept: GASTROENTEROLOGY | Facility: CLINIC | Age: 73
End: 2023-03-10
Payer: MEDICARE

## 2023-03-10 VITALS
WEIGHT: 182 LBS | HEIGHT: 60 IN | SYSTOLIC BLOOD PRESSURE: 144 MMHG | TEMPERATURE: 97.2 F | DIASTOLIC BLOOD PRESSURE: 98 MMHG | HEART RATE: 85 BPM | BODY MASS INDEX: 35.73 KG/M2 | OXYGEN SATURATION: 98 %

## 2023-03-10 PROCEDURE — 99213 OFFICE O/P EST LOW 20 MIN: CPT

## 2023-03-10 NOTE — PHYSICAL EXAM
[Alert] : alert [Normal Voice/Communication] : normal voice/communication [No Acute Distress] : no acute distress [Obese (BMI >= 30)] : obese (BMI >= 30) [Sclera] : the sclera and conjunctiva were normal [Normal Appearance] : the appearance of the neck was normal [No Respiratory Distress] : no respiratory distress [No Acc Muscle Use] : no accessory muscle use [Respiration, Rhythm And Depth] : normal respiratory rhythm and effort [Auscultation Breath Sounds / Voice Sounds] : lungs were clear to auscultation bilaterally [Normal S1, S2] : normal S1 and S2 [Heart Rate And Rhythm] : heart rate was normal and rhythm regular [Murmurs] : no murmurs [Bowel Sounds] : normal bowel sounds [Abdomen Tenderness] : non-tender [No Masses] : no abdominal mass palpated [Abdomen Soft] : soft [] : no hepatosplenomegaly [Abnormal Walk] : normal gait [No Clubbing, Cyanosis] : no clubbing or cyanosis of the fingernails [Normal Color / Pigmentation] : normal skin color and pigmentation [Oriented To Time, Place, And Person] : oriented to person, place, and time [Normal Affect] : the affect was normal [Normal Mood] : the mood was normal [de-identified] : The abdomen is obese, soft, nontender, nondistended and without mass or hepatosplenomegaly.

## 2023-03-10 NOTE — ASSESSMENT
[FreeTextEntry1] : Impression:\par \par #1 GERD- \par            -EGD on 1/25/18 noted for 3 cm hiatus hernia association with LA grade C erosive esophagitis. Davila's esophagus not detected. \par            -EGD on 11/21/19 was noted for normal esophagus with detection of a 2 cm hiatus hernia (Hill grade 2-3) without detection of erosive esophagitis or Davila's esophagus. \par            -Patient has continued to derive an excellent symptomatic response to current regimen of pantoprazole 20 mg daily with resolution of heartburn and dysphagia.\par \par #2 history of hoarseness–previously reported symptoms of altered vocal quality and hoarseness of 1 month duration with laryngitis-like symptoms.  Question as to possible GERD exacerbated symptoms with component of LPR considered.  Resolved at current time–no complaint of hoarseness.\par \par #3 history of colonic adenomatous polyps-status post removal of a 1 cm sessile rectosigmoid junction polyp with high-grade dysplasia in 2004 and status post removal of a 3 mm hepatic flexure adenoma, 4 mm proximal transverse colon hyperplastic polyp and 3 mm distal sigmoid adenoma at the 12/24/14 colonoscopy. Colonoscopy on 11/21/2019 noted removal of a 3 mm diminutive cecal adenoma.\par \par #4 history of infectious mononucleosis hepatitis in 2004.\par \par #5 obesity- BMI 35.54.

## 2023-03-10 NOTE — HISTORY OF PRESENT ILLNESS
[FreeTextEntry1] : Office revisit on 3/10/2023.\par \par Patient presents for followup regarding treatment of  GERD.\par \par The patient was previously evaluated for this consultation on 10/29/14.\par \par INITIAL CONSULTATION NOTE:\par \par Office consultation on 10/29/14. The patient is a 64-year-old woman who is being evaluated for a surveillance colonoscopy\par \par The patient is currently asymptomatic from a gastrointestinal standpoint. She has one formed bowel movement daily without any complaints of diarrhea, constipation, change in bowel habit or rectal bleeding. Her appetite and weight are stable. She reports no complaints of dysphagia, heartburn, nausea, vomiting or abdominal pain.\par \par The patient previously experienced irregular bowel movements. She could have a bowel movement every day but then may not move her bowels for 2 or 3 days. However, since starting a regimen of yogurt with either Greek yogurt or Activia she is having regular daily bowel movements without any irregularity or difficulty.\par \par In 2004 the patient underwent a colonoscopy at which time a 1cm sessile polyp at the rectosigmoid junction with high-grade dysplasia was removed. The patient underwent colonoscopy examinations in 2005, 2006 and 2008 at which time there were no neoplastic polyps removed. A few hyperplastic polyps were removed.\par \par The patient's last colonoscopy was on 4/27/10 at which time melanosis coli was noted. Polyps were not detected at that examination.\par \par COLONOSCOPY 12/31/14:    -The patient underwent a surveillance colonoscopy on 12/22/14. A total colonoscopy was performed to the cecum with ileoscopy. A 3 mm hepatic flexure adenoma, 4 mm proximal transverse colon hyperplastic polyp and a 3 mm distal sigmoid adenoma were removed. A followup surveillance colonoscopy is advised in 5 years. \par \par CURRENT HISTORY:\par \par ORV 12/22/17:    -Patient presented with complaint of heartburn of one year duration. Approximately one-year symptoms but with increase in severity over the past few months. Heartburn described as retrosternal burning. Mostly daytime symptoms and triggered by oral intake such as water and yogurt. Paradoxically fried and fatty food is less provocative. Experiences approximately 3-4 episodes per week. Gets some relief with Zantac 150 mg. Experiences sense of dysphagia with slow bolus passage both to liquids and solids. This is mild and intermittent.\par             -Appetite and weight are stable. Patient has one formed bowel movement daily without any complaints of diarrhea, constipation, change in bowel habit or rectal bleeding.\par               -Patient complains of postnasal drip with associated throat eructation and cough. Denies dyspnea, wheezing, chest pain, sinus congestion, hoarseness or focal change. Patient indicates that postnasal drip and cough and sometimes provoke nonbloody vomiting.\par \par EGD 1/25/18:    -EGD was performed on 1/25/18 to evaluate GERD symptoms and dysphagia. Examination of the esophagus revealed a 3 cm hiatus hernia in association with LA grade C erosive esophagitis. Distal esophagus biopsy revealed active esophagitis with ulcer consistent with reflux esophagitis. Dysplasia or any evidence of fungi or viral cytopathic change was not detected. Examination of the stomach was normal. Gastric antrum biopsy revealed a nonspecific reactive gastropathy. Testing was negative for Helicobacter pylori. The visualized duodenum to the second portion was normal.\par \par ORV 5/4/18:    -Patient presents for followup regarding recent diagnosis of erosive esophagitis. Currently doing very well on a regimen of pantoprazole 40 mg daily. No complaints of breakthrough heartburn, regurgitation or dysphagia.  Denies nausea, vomiting, abdominal pain or other GI symptoms.\par \par ORV 11/7/18:    -Patient presents for followup of history of GERD. Reports doing very well on current regimen of maintenance pantoprazole 40 mg q.d. No complaint of any breakthrough heartburn, regurgitation, nausea or vomiting. Appetite and weight are stable. Denies any complaints of abdominal pain, diarrhea, constipation, change in bowel habit or rectal bleeding.\par            -Patient underwent left THR 7/2018.\par \par ORV 5/15/19:    -Patient presents for followup regarding history of GERD. In addition, planned for surveillance colonoscopy. Currently doing very well on maintenance pantoprazole 40 mg q.d. Appetite and weight are stable. Denies any complaints of breakthrough heartburn, regurgitation or other reflux symptoms. Denies dysphagia, nausea, vomiting, abdominal pain, diarrhea, constipation, change in bowel habit or rectal bleeding.\par \par COLONOSCOPY 11/21/2019:       -Surveillance colonoscopy was performed on 11/21/19. Patient previously had removal of a colonic adenoma with high-grade dysplasia. Total colonoscopy was performed to the cecum with ileoscopy. Normal terminal ileum. A 3 mm diminutive cecal adenoma was removed. The colonoscopy examination was otherwise normal. A followup surveillance colonoscopy is advised in 3 years.\par \par EGD 11/21/2019:     -EGD was performed to evaluate a history of erosive GERD. Examination of the esophagus was normal except for a 2 cm hiatus hernia (Hill grade 2-3). There was no erosive esophagitis or Davila's esophagus. Biopsy of the esophagus was normal. Examination of the stomach was normal except for a few gastric body fundic gland polyps. Gastric biopsy was negative for Helicobacter pylori. Gastric intestinal metaplasia was not detected. Visualized duodenum to the second portion was normal. Patient was advised to continue current regimen of antisecretory therapy. \par \par ORV 7/15/2020:     -Presents for followup regarding history of GERD. Currently asymptomatic on pantoprazole 40 mg daily. Denies any complaints of breakthrough heartburn. Appetite is stable and may have gained a few pounds. No complaints of dysphagia, heartburn, nausea, vomiting or abdominal pain. Has daily formed bowel movements without report of diarrhea, constipation, change in bowel habit or rectal bleeding.\par \par ORV 11/26/2021:     -Evaluated for follow-up regarding GERD.  Indicates that 1 month ago he had sudden onset of "laryngitis with hoarseness and altered vocal quality.  Has had similar spontaneous episodes in the past but typically would last several days and spontaneously resolve.  Protracted symptoms ultimately prompted evaluation by ENT on 11/18/2021 prompting therapy with Medrol Dosepak and Flonase.  Fiberoptic laryngoscopy findings as per ENT note indicates edema of arytenoids and some erythema vocal cords.  Patient indicates that discussion with ENT consultant question whether GERD could be contributory.  Significantly improved at current time with approximate 75% reduction in laryngitis-like symptoms.\par                                   -Otherwise, reports feeling well.  At no point in association with laryngitis symptoms was experiencing heartburn, regurgitation or typical GERD symptoms.  Appetite and weight are stable.  Denies complaints of dysphagia, heartburn, regurgitation, nausea, vomiting or abdominal pain.  Has daily formed bowel movements without any report of diarrhea, constipation, change in bowel habit or rectal bleeding.  Currently on maintenance regimen of pantoprazole 20 mg daily which has controlled her typical GERD symptoms very well.\par \par ORV 3/10/2023:     -Presents for follow-up regarding history of erosive GERD.  Currently on a regimen of pantoprazole 20 mg daily.  Doing well at current time.  No complaints of breakthrough heartburn.  Appetite and weight are stable.  Reports no complaints of heartburn, regurgitation, nausea, vomiting or abdominal pain.  On a regimen of psyllium 2 teaspoons daily has 1 formed bowel movement daily without any complaints of diarrhea, constipation, change in bowel habit or rectal bleeding.

## 2023-04-27 NOTE — ASU PATIENT PROFILE, ADULT - NSCAFFEAMTFREQ_GEN_ALL_CORE_SD
Alert Team Note:    Contacted [facility], spoke to Radha. She was off yesterday, and can no longer find pts referral. Writer has re faxed referral as requested. Facility has no bed availability at this time.    1-2 cups/cans per day

## 2023-05-23 ENCOUNTER — TRANSCRIPTION ENCOUNTER (OUTPATIENT)
Age: 73
End: 2023-05-23

## 2023-05-23 ENCOUNTER — RX RENEWAL (OUTPATIENT)
Age: 73
End: 2023-05-23

## 2023-06-29 ENCOUNTER — TRANSCRIPTION ENCOUNTER (OUTPATIENT)
Age: 73
End: 2023-06-29

## 2023-06-30 ENCOUNTER — RESULT REVIEW (OUTPATIENT)
Age: 73
End: 2023-06-30

## 2023-06-30 ENCOUNTER — TRANSCRIPTION ENCOUNTER (OUTPATIENT)
Age: 73
End: 2023-06-30

## 2023-07-02 ENCOUNTER — NON-APPOINTMENT (OUTPATIENT)
Age: 73
End: 2023-07-02

## 2023-07-03 ENCOUNTER — TRANSCRIPTION ENCOUNTER (OUTPATIENT)
Age: 73
End: 2023-07-03

## 2023-07-05 ENCOUNTER — TRANSCRIPTION ENCOUNTER (OUTPATIENT)
Age: 73
End: 2023-07-05

## 2023-07-28 ENCOUNTER — APPOINTMENT (OUTPATIENT)
Dept: OBGYN | Facility: CLINIC | Age: 73
End: 2023-07-28
Payer: MEDICARE

## 2023-07-28 VITALS
SYSTOLIC BLOOD PRESSURE: 160 MMHG | WEIGHT: 186.5 LBS | BODY MASS INDEX: 36.61 KG/M2 | HEIGHT: 60 IN | DIASTOLIC BLOOD PRESSURE: 90 MMHG

## 2023-07-28 DIAGNOSIS — Z01.419 ENCOUNTER FOR GYNECOLOGICAL EXAMINATION (GENERAL) (ROUTINE) W/OUT ABNORMAL FINDINGS: ICD-10-CM

## 2023-07-28 DIAGNOSIS — Z00.00 ENCOUNTER FOR GENERAL ADULT MEDICAL EXAMINATION W/OUT ABNORMAL FINDINGS: ICD-10-CM

## 2023-07-28 PROCEDURE — G0101: CPT

## 2023-07-28 NOTE — DISCUSSION/SUMMARY
[FreeTextEntry1] : 71 y/o postmenopausal woman  LMP 2012 no HRT \par Pap \par Pt has breast imaging refrral\par UTD with colonoscopy\par BDS\par

## 2023-07-28 NOTE — HISTORY OF PRESENT ILLNESS
[FreeTextEntry1] : 71 y/o postmenopausal woman  LMP 2012 no HRT \par no vaginal bleeding\par no hot flashes \par Hx of normal Pap smears, last Pap  negative \par Mammogram and breast us [facility] 2022\par Hx of breast lumpectomy and breast biopsies, benign\par \par \par

## 2023-08-06 LAB — CYTOLOGY CVX/VAG DOC THIN PREP: NORMAL

## 2023-08-10 ENCOUNTER — TRANSCRIPTION ENCOUNTER (OUTPATIENT)
Age: 73
End: 2023-08-10

## 2023-08-10 RX ORDER — PANTOPRAZOLE 20 MG/1
20 TABLET, DELAYED RELEASE ORAL
Qty: 90 | Refills: 3 | Status: ACTIVE | COMMUNITY
Start: 2022-08-26 | End: 1900-01-01

## 2023-08-14 ENCOUNTER — TRANSCRIPTION ENCOUNTER (OUTPATIENT)
Age: 73
End: 2023-08-14

## 2023-08-16 ENCOUNTER — TRANSCRIPTION ENCOUNTER (OUTPATIENT)
Age: 73
End: 2023-08-16

## 2023-08-18 ENCOUNTER — TRANSCRIPTION ENCOUNTER (OUTPATIENT)
Age: 73
End: 2023-08-18

## 2023-10-12 ENCOUNTER — APPOINTMENT (OUTPATIENT)
Dept: INTERNAL MEDICINE | Facility: CLINIC | Age: 73
End: 2023-10-12
Payer: MEDICARE

## 2023-10-12 VITALS
BODY MASS INDEX: 36.52 KG/M2 | RESPIRATION RATE: 17 BRPM | OXYGEN SATURATION: 97 % | DIASTOLIC BLOOD PRESSURE: 85 MMHG | SYSTOLIC BLOOD PRESSURE: 157 MMHG | WEIGHT: 187 LBS | HEART RATE: 92 BPM

## 2023-10-12 VITALS — DIASTOLIC BLOOD PRESSURE: 82 MMHG | SYSTOLIC BLOOD PRESSURE: 128 MMHG

## 2023-10-12 DIAGNOSIS — I10 ESSENTIAL (PRIMARY) HYPERTENSION: ICD-10-CM

## 2023-10-12 DIAGNOSIS — Z00.00 ENCOUNTER FOR GENERAL ADULT MEDICAL EXAMINATION W/OUT ABNORMAL FINDINGS: ICD-10-CM

## 2023-10-12 DIAGNOSIS — R73.09 OTHER ABNORMAL GLUCOSE: ICD-10-CM

## 2023-10-12 DIAGNOSIS — Z23 ENCOUNTER FOR IMMUNIZATION: ICD-10-CM

## 2023-10-12 PROCEDURE — 90662 IIV NO PRSV INCREASED AG IM: CPT

## 2023-10-12 PROCEDURE — G0439: CPT

## 2023-10-12 PROCEDURE — G0008: CPT

## 2023-10-12 PROCEDURE — 36415 COLL VENOUS BLD VENIPUNCTURE: CPT

## 2023-10-13 ENCOUNTER — TRANSCRIPTION ENCOUNTER (OUTPATIENT)
Age: 73
End: 2023-10-13

## 2023-10-13 LAB
25(OH)D3 SERPL-MCNC: 57.7 NG/ML
ALBUMIN SERPL ELPH-MCNC: 5 G/DL
ALP BLD-CCNC: 68 U/L
ALT SERPL-CCNC: 31 U/L
ANION GAP SERPL CALC-SCNC: 14 MMOL/L
AST SERPL-CCNC: 24 U/L
BILIRUB SERPL-MCNC: 0.6 MG/DL
BUN SERPL-MCNC: 13 MG/DL
CALCIUM SERPL-MCNC: 9.9 MG/DL
CHLORIDE SERPL-SCNC: 100 MMOL/L
CHOLEST SERPL-MCNC: 197 MG/DL
CO2 SERPL-SCNC: 26 MMOL/L
COVID-19 SPIKE DOMAIN ANTIBODY INTERPRETATION: POSITIVE
CREAT SERPL-MCNC: 0.67 MG/DL
EGFR: 94 ML/MIN/1.73M2
ESTIMATED AVERAGE GLUCOSE: 126 MG/DL
GLUCOSE SERPL-MCNC: 108 MG/DL
HBA1C MFR BLD HPLC: 6 %
HCT VFR BLD CALC: 45 %
HDLC SERPL-MCNC: 95 MG/DL
HGB BLD-MCNC: 14.4 G/DL
LDLC SERPL CALC-MCNC: 85 MG/DL
MCHC RBC-ENTMCNC: 28.5 PG
MCHC RBC-ENTMCNC: 32 GM/DL
MCV RBC AUTO: 88.9 FL
NONHDLC SERPL-MCNC: 102 MG/DL
PLATELET # BLD AUTO: 276 K/UL
POTASSIUM SERPL-SCNC: 3.9 MMOL/L
PROT SERPL-MCNC: 7.2 G/DL
RBC # BLD: 5.06 M/UL
RBC # FLD: 14 %
SARS-COV-2 AB SERPL IA-ACNC: >250 U/ML
SODIUM SERPL-SCNC: 140 MMOL/L
TRIGL SERPL-MCNC: 98 MG/DL
TSH SERPL-ACNC: 1.56 UIU/ML
WBC # FLD AUTO: 7.39 K/UL

## 2023-11-03 ENCOUNTER — APPOINTMENT (OUTPATIENT)
Dept: CT IMAGING | Facility: CLINIC | Age: 73
End: 2023-11-03
Payer: SELF-PAY

## 2023-11-03 ENCOUNTER — OUTPATIENT (OUTPATIENT)
Dept: OUTPATIENT SERVICES | Facility: HOSPITAL | Age: 73
LOS: 1 days | End: 2023-11-03
Payer: SELF-PAY

## 2023-11-03 DIAGNOSIS — Z98.89 OTHER SPECIFIED POSTPROCEDURAL STATES: Chronic | ICD-10-CM

## 2023-11-03 DIAGNOSIS — Z98.890 OTHER SPECIFIED POSTPROCEDURAL STATES: Chronic | ICD-10-CM

## 2023-11-03 DIAGNOSIS — E66.9 OBESITY, UNSPECIFIED: ICD-10-CM

## 2023-11-03 DIAGNOSIS — I10 ESSENTIAL (PRIMARY) HYPERTENSION: ICD-10-CM

## 2023-11-03 DIAGNOSIS — Z96.642 PRESENCE OF LEFT ARTIFICIAL HIP JOINT: Chronic | ICD-10-CM

## 2023-11-03 PROCEDURE — 75571 CT HRT W/O DYE W/CA TEST: CPT | Mod: 26

## 2023-11-03 PROCEDURE — 75571 CT HRT W/O DYE W/CA TEST: CPT

## 2023-11-07 ENCOUNTER — RESULT REVIEW (OUTPATIENT)
Age: 73
End: 2023-11-07

## 2023-11-07 ENCOUNTER — APPOINTMENT (OUTPATIENT)
Dept: RADIOLOGY | Facility: IMAGING CENTER | Age: 73
End: 2023-11-07
Payer: MEDICARE

## 2023-11-07 ENCOUNTER — APPOINTMENT (OUTPATIENT)
Dept: ULTRASOUND IMAGING | Facility: IMAGING CENTER | Age: 73
End: 2023-11-07
Payer: MEDICARE

## 2023-11-07 ENCOUNTER — OUTPATIENT (OUTPATIENT)
Dept: OUTPATIENT SERVICES | Facility: HOSPITAL | Age: 73
LOS: 1 days | End: 2023-11-07
Payer: MEDICARE

## 2023-11-07 ENCOUNTER — APPOINTMENT (OUTPATIENT)
Dept: MAMMOGRAPHY | Facility: IMAGING CENTER | Age: 73
End: 2023-11-07
Payer: MEDICARE

## 2023-11-07 DIAGNOSIS — Z98.890 OTHER SPECIFIED POSTPROCEDURAL STATES: Chronic | ICD-10-CM

## 2023-11-07 DIAGNOSIS — Z98.890 OTHER SPECIFIED POSTPROCEDURAL STATES: ICD-10-CM

## 2023-11-07 DIAGNOSIS — Z96.642 PRESENCE OF LEFT ARTIFICIAL HIP JOINT: Chronic | ICD-10-CM

## 2023-11-07 DIAGNOSIS — N60.19 DIFFUSE CYSTIC MASTOPATHY OF UNSPECIFIED BREAST: ICD-10-CM

## 2023-11-07 DIAGNOSIS — Z00.00 ENCOUNTER FOR GENERAL ADULT MEDICAL EXAMINATION WITHOUT ABNORMAL FINDINGS: ICD-10-CM

## 2023-11-07 DIAGNOSIS — Z98.89 OTHER SPECIFIED POSTPROCEDURAL STATES: Chronic | ICD-10-CM

## 2023-11-07 PROCEDURE — 76641 ULTRASOUND BREAST COMPLETE: CPT

## 2023-11-07 PROCEDURE — 77067 SCR MAMMO BI INCL CAD: CPT | Mod: 26

## 2023-11-07 PROCEDURE — 76641 ULTRASOUND BREAST COMPLETE: CPT | Mod: 26,50,GY

## 2023-11-07 PROCEDURE — 77085 DXA BONE DENSITY AXL VRT FX: CPT | Mod: 26

## 2023-11-07 PROCEDURE — 77080 DXA BONE DENSITY AXIAL: CPT

## 2023-11-07 PROCEDURE — 77063 BREAST TOMOSYNTHESIS BI: CPT | Mod: 26

## 2023-11-07 PROCEDURE — 77063 BREAST TOMOSYNTHESIS BI: CPT

## 2023-11-07 PROCEDURE — 77067 SCR MAMMO BI INCL CAD: CPT

## 2023-11-07 PROCEDURE — 77085 DXA BONE DENSITY AXL VRT FX: CPT

## 2023-12-21 ENCOUNTER — APPOINTMENT (OUTPATIENT)
Dept: OBGYN | Facility: CLINIC | Age: 73
End: 2023-12-21

## 2024-01-23 ENCOUNTER — TRANSCRIPTION ENCOUNTER (OUTPATIENT)
Age: 74
End: 2024-01-23

## 2024-02-23 ENCOUNTER — TRANSCRIPTION ENCOUNTER (OUTPATIENT)
Age: 74
End: 2024-02-23

## 2024-03-18 ENCOUNTER — APPOINTMENT (OUTPATIENT)
Dept: GASTROENTEROLOGY | Facility: CLINIC | Age: 74
End: 2024-03-18
Payer: MEDICARE

## 2024-03-18 VITALS
BODY MASS INDEX: 36.32 KG/M2 | HEART RATE: 89 BPM | OXYGEN SATURATION: 97 % | DIASTOLIC BLOOD PRESSURE: 79 MMHG | HEIGHT: 60 IN | SYSTOLIC BLOOD PRESSURE: 129 MMHG | WEIGHT: 185 LBS

## 2024-03-18 DIAGNOSIS — E66.9 OBESITY, UNSPECIFIED: ICD-10-CM

## 2024-03-18 DIAGNOSIS — K21.9 GASTRO-ESOPHAGEAL REFLUX DISEASE W/OUT ESOPHAGITIS: ICD-10-CM

## 2024-03-18 DIAGNOSIS — Z12.11 ENCOUNTER FOR SCREENING FOR MALIGNANT NEOPLASM OF COLON: ICD-10-CM

## 2024-03-18 PROCEDURE — 99214 OFFICE O/P EST MOD 30 MIN: CPT

## 2024-03-18 NOTE — CONSULT LETTER
[Dear  ___] : Dear  [unfilled], [Consult Closing:] : Thank you very much for allowing me to participate in the care of this patient.  If you have any questions, please do not hesitate to contact me. [Sincerely,] : Sincerely, [Please see my note below.] : Please see my note below. [FreeTextEntry2] : Dr. Elinor Barahona [FreeTextEntry3] : Zak Suero M.D.

## 2024-03-18 NOTE — PHYSICAL EXAM
[Alert] : alert [Normal Voice/Communication] : normal voice/communication [No Acute Distress] : no acute distress [Obese (BMI >= 30)] : obese (BMI >= 30) [Sclera] : the sclera and conjunctiva were normal [Normal Appearance] : the appearance of the neck was normal [No Respiratory Distress] : no respiratory distress [No Acc Muscle Use] : no accessory muscle use [Respiration, Rhythm And Depth] : normal respiratory rhythm and effort [Auscultation Breath Sounds / Voice Sounds] : lungs were clear to auscultation bilaterally [Heart Rate And Rhythm] : heart rate was normal and rhythm regular [Normal S1, S2] : normal S1 and S2 [Murmurs] : no murmurs [Abdomen Tenderness] : non-tender [Bowel Sounds] : normal bowel sounds [Abdomen Soft] : soft [No Masses] : no abdominal mass palpated [] : no hepatosplenomegaly [No Clubbing, Cyanosis] : no clubbing or cyanosis of the fingernails [Abnormal Walk] : normal gait [Normal Color / Pigmentation] : normal skin color and pigmentation [Oriented To Time, Place, And Person] : oriented to person, place, and time [Normal Affect] : the affect was normal [Normal Mood] : the mood was normal [de-identified] : The abdomen is obese, soft, nontender, nondistended and without mass or hepatosplenomegaly.

## 2024-03-18 NOTE — REASON FOR VISIT
[Follow-up] : a follow-up of an existing diagnosis [FreeTextEntry1] : for follow-up of GERD and in preparation for surveillance colonoscopy.

## 2024-03-18 NOTE — HISTORY OF PRESENT ILLNESS
[FreeTextEntry1] : Office revisit on 3/18/2024.  Patient presents for follow-up regarding treatment of GERD and in preparation for surveillance colonoscopy.  The patient was previously evaluated for this consultation on 10/29/14.  INITIAL CONSULTATION NOTE:  Office consultation on 10/29/14. The patient is a 64-year-old woman who is being evaluated for a surveillance colonoscopy  The patient is currently asymptomatic from a gastrointestinal standpoint. She has one formed bowel movement daily without any complaints of diarrhea, constipation, change in bowel habit or rectal bleeding. Her appetite and weight are stable. She reports no complaints of dysphagia, heartburn, nausea, vomiting or abdominal pain.  The patient previously experienced irregular bowel movements. She could have a bowel movement every day but then may not move her bowels for 2 or 3 days. However, since starting a regimen of yogurt with either Greek yogurt or Activia she is having regular daily bowel movements without any irregularity or difficulty.  In 2004 the patient underwent a colonoscopy at which time a 1cm sessile polyp at the rectosigmoid junction with high-grade dysplasia was removed. The patient underwent colonoscopy examinations in 2005, 2006 and 2008 at which time there were no neoplastic polyps removed. A few hyperplastic polyps were removed.  The patient's last colonoscopy was on 4/27/10 at which time melanosis coli was noted. Polyps were not detected at that examination.  COLONOSCOPY 12/31/14:    -The patient underwent a surveillance colonoscopy on 12/22/14. A total colonoscopy was performed to the cecum with ileoscopy. A 3 mm hepatic flexure adenoma, 4 mm proximal transverse colon hyperplastic polyp and a 3 mm distal sigmoid adenoma were removed. A followup surveillance colonoscopy is advised in 5 years.   CURRENT HISTORY:  ORV 12/22/17:    -Patient presented with complaint of heartburn of one year duration. Approximately one-year symptoms but with increase in severity over the past few months. Heartburn described as retrosternal burning. Mostly daytime symptoms and triggered by oral intake such as water and yogurt. Paradoxically fried and fatty food is less provocative. Experiences approximately 3-4 episodes per week. Gets some relief with Zantac 150 mg. Experiences sense of dysphagia with slow bolus passage both to liquids and solids. This is mild and intermittent.             -Appetite and weight are stable. Patient has one formed bowel movement daily without any complaints of diarrhea, constipation, change in bowel habit or rectal bleeding.               -Patient complains of postnasal drip with associated throat eructation and cough. Denies dyspnea, wheezing, chest pain, sinus congestion, hoarseness or focal change. Patient indicates that postnasal drip and cough and sometimes provoke nonbloody vomiting.  EGD 1/25/18:    -EGD was performed on 1/25/18 to evaluate GERD symptoms and dysphagia. Examination of the esophagus revealed a 3 cm hiatus hernia in association with LA grade C erosive esophagitis. Distal esophagus biopsy revealed active esophagitis with ulcer consistent with reflux esophagitis. Dysplasia or any evidence of fungi or viral cytopathic change was not detected. Examination of the stomach was normal. Gastric antrum biopsy revealed a nonspecific reactive gastropathy. Testing was negative for Helicobacter pylori. The visualized duodenum to the second portion was normal.  ORV 5/4/18:    -Patient presents for followup regarding recent diagnosis of erosive esophagitis. Currently doing very well on a regimen of pantoprazole 40 mg daily. No complaints of breakthrough heartburn, regurgitation or dysphagia.  Denies nausea, vomiting, abdominal pain or other GI symptoms.  ORV 11/7/18:    -Patient presents for followup of history of GERD. Reports doing very well on current regimen of maintenance pantoprazole 40 mg q.d. No complaint of any breakthrough heartburn, regurgitation, nausea or vomiting. Appetite and weight are stable. Denies any complaints of abdominal pain, diarrhea, constipation, change in bowel habit or rectal bleeding.            -Patient underwent left THR 7/2018.  ORV 5/15/19:    -Patient presents for followup regarding history of GERD. In addition, planned for surveillance colonoscopy. Currently doing very well on maintenance pantoprazole 40 mg q.d. Appetite and weight are stable. Denies any complaints of breakthrough heartburn, regurgitation or other reflux symptoms. Denies dysphagia, nausea, vomiting, abdominal pain, diarrhea, constipation, change in bowel habit or rectal bleeding.  COLONOSCOPY 11/21/2019:       -Surveillance colonoscopy was performed on 11/21/19. Patient previously had removal of a colonic adenoma with high-grade dysplasia. Total colonoscopy was performed to the cecum with ileoscopy. Normal terminal ileum. A 3 mm diminutive cecal adenoma was removed. The colonoscopy examination was otherwise normal. A followup surveillance colonoscopy is advised in 3 years.  EGD 11/21/2019:     -EGD was performed to evaluate a history of erosive GERD. Examination of the esophagus was normal except for a 2 cm hiatus hernia (Hill grade 2-3). There was no erosive esophagitis or Davila's esophagus. Biopsy of the esophagus was normal. Examination of the stomach was normal except for a few gastric body fundic gland polyps. Gastric biopsy was negative for Helicobacter pylori. Gastric intestinal metaplasia was not detected. Visualized duodenum to the second portion was normal. Patient was advised to continue current regimen of antisecretory therapy.   ORV 7/15/2020:     -Presents for followup regarding history of GERD. Currently asymptomatic on pantoprazole 40 mg daily. Denies any complaints of breakthrough heartburn. Appetite is stable and may have gained a few pounds. No complaints of dysphagia, heartburn, nausea, vomiting or abdominal pain. Has daily formed bowel movements without report of diarrhea, constipation, change in bowel habit or rectal bleeding.  ORV 11/26/2021:     -Evaluated for follow-up regarding GERD.  Indicates that 1 month ago he had sudden onset of "laryngitis with hoarseness and altered vocal quality.  Has had similar spontaneous episodes in the past but typically would last several days and spontaneously resolve.  Protracted symptoms ultimately prompted evaluation by ENT on 11/18/2021 prompting therapy with Medrol Dosepak and Flonase.  Fiberoptic laryngoscopy findings as per ENT note indicates edema of arytenoids and some erythema vocal cords.  Patient indicates that discussion with ENT consultant question whether GERD could be contributory.  Significantly improved at current time with approximate 75% reduction in laryngitis-like symptoms.                                   -Otherwise, reports feeling well.  At no point in association with laryngitis symptoms was experiencing heartburn, regurgitation or typical GERD symptoms.  Appetite and weight are stable.  Denies complaints of dysphagia, heartburn, regurgitation, nausea, vomiting or abdominal pain.  Has daily formed bowel movements without any report of diarrhea, constipation, change in bowel habit or rectal bleeding.  Currently on maintenance regimen of pantoprazole 20 mg daily which has controlled her typical GERD symptoms very well.  ORV 3/10/2023:     -Presents for follow-up regarding history of erosive GERD.  Currently on a regimen of pantoprazole 20 mg daily.  Doing well at current time.  No complaints of breakthrough heartburn.  Appetite and weight are stable.  Reports no complaints of heartburn, regurgitation, nausea, vomiting or abdominal pain.  On a regimen of psyllium 2 teaspoons daily has 1 formed bowel movement daily without any complaints of diarrhea, constipation, change in bowel habit or rectal bleeding.   ORV 3/18/2024:     -Evaluated for follow-up regarding history of GERD and in preparation for surveillance colonoscopy.  Reports doing well on maintenance pantoprazole 20 mg daily.  Denies complaints of breakthrough heartburn, regurgitation or other GERD symptoms.  Infrequently if she overeats, she may have a vague sense of retrosternal discomfort.  Otherwise, she reports no complaints.  Appetite and weight are stable.  Denies complaints of nausea, vomiting or abdominal pain.  Has daily formed bowel movements without complaints of diarrhea, constipation or rectal bleeding.                                  -Medications: Levothyroxine 25 mcg, pantoprazole 20 mg, Metamucil 1 tablespoon and numerous vitamins.

## 2024-03-18 NOTE — ADDENDUM
[FreeTextEntry1] : Plan:  #1 current GI status discussed from standpoint of GERD and plan for surveillance colonoscopy reviewed with patient.  As noted, doing well at current time.  GERD well-controlled on pantoprazole 20 mg daily.  #2 antireflux precautions.  #3 pantoprazole 20 mg daily 30 minutes AC.  #4 surveillance colonoscopy will be scheduled. I had an extensive discussion with the patient including risks, benefits and alternatives possibly including bleeding, perforation, need for blood transfusion or surgery, infection, and medication and anesthetic risk. The limitations of colonoscopy were discussed including missed polyps and cancer.  Possible medication and anesthesia related adverse cardiovascular and respiratory reactions were reviewed.  The patient wishes to proceed and will be scheduled for a surveillance colonoscopy.  The rationale of colonoscopy was discussed not only in terms of the early detection of colon cancer, but also as a means of prevention in the event of removal of a polyp.  The limitations of colonoscopy were discussed, and the patient is aware that not every cancer is prevented.  Colonoscopy preparation options were discussed.  The patient decayed she lacks coverage for most of the reduced volume preparations.  Her preference is to utilize either the citrate of magnesia preparation or MiraLAX preparation.  Upper endoscopy will be performed at the same session in view of prior history of erosive esophagitis.  #5 office revisit in 1 year.

## 2024-03-18 NOTE — REVIEW OF SYSTEMS
[As Noted in HPI] : as noted in HPI [Abdominal Pain] : no abdominal pain [Vomiting] : no vomiting [Constipation] : no constipation [Diarrhea] : no diarrhea [Heartburn] : no heartburn [Melena (black stool)] : no melena [Bleeding] : no bleeding [Fecal Incontinence (soiling)] : no fecal incontinence [Bloating (gassiness)] : no bloating [Swollen Glands] : no swollen glands

## 2024-03-18 NOTE — ASSESSMENT
[FreeTextEntry1] : Impression:  #1 GERD-             -EGD on 1/25/18 noted for 3 cm hiatus hernia association with LA grade C erosive esophagitis. Davila's esophagus not detected.             -EGD on 11/21/19 was noted for normal esophagus with detection of a 2 cm hiatus hernia (Hill grade 2-3) without detection of erosive esophagitis or Davila's esophagus.             -CURRENT 3/18/2024: Continues to derive an excellent symptomatic response to current regimen of pantoprazole 20 mg daily with resolution of heartburn and dysphagia.  #2 history of hoarseness-previously reported symptoms of altered vocal quality and hoarseness of 1 month duration with laryngitis-like symptoms.  Question as to possible GERD exacerbated symptoms with component of LPR considered.  Resolved at current time-no complaint of hoarseness.  #3 history of colonic adenomatous polyps-status post removal of a 1 cm sessile rectosigmoid junction polyp with high-grade dysplasia in 2004 and status post removal of a 3 mm hepatic flexure adenoma, 4 mm proximal transverse colon hyperplastic polyp and 3 mm distal sigmoid adenoma at the 12/24/14 colonoscopy. Colonoscopy on 11/21/2019 noted removal of a 3 mm diminutive cecal adenoma.  Rule out metachronous lesions.  #4 history of infectious mononucleosis hepatitis in 2004.  #5 obesity- BMI 36.13.

## 2024-05-20 ENCOUNTER — TRANSCRIPTION ENCOUNTER (OUTPATIENT)
Age: 74
End: 2024-05-20

## 2024-05-23 ENCOUNTER — TRANSCRIPTION ENCOUNTER (OUTPATIENT)
Age: 74
End: 2024-05-23

## 2024-05-23 RX ORDER — LEVOTHYROXINE SODIUM 0.03 MG/1
25 TABLET ORAL
Qty: 90 | Refills: 3 | Status: ACTIVE | COMMUNITY
Start: 2019-12-03 | End: 1900-01-01

## 2024-06-28 ENCOUNTER — TRANSCRIPTION ENCOUNTER (OUTPATIENT)
Age: 74
End: 2024-06-28

## 2024-07-08 ENCOUNTER — TRANSCRIPTION ENCOUNTER (OUTPATIENT)
Age: 74
End: 2024-07-08

## 2024-07-09 ENCOUNTER — TRANSCRIPTION ENCOUNTER (OUTPATIENT)
Age: 74
End: 2024-07-09

## 2024-08-20 ENCOUNTER — TRANSCRIPTION ENCOUNTER (OUTPATIENT)
Age: 74
End: 2024-08-20

## 2024-08-20 RX ORDER — PANTOPRAZOLE 20 MG/1
20 TABLET, DELAYED RELEASE ORAL
Qty: 90 | Refills: 3 | Status: ACTIVE | COMMUNITY
Start: 2024-08-20 | End: 1900-01-01

## 2024-08-21 ENCOUNTER — TRANSCRIPTION ENCOUNTER (OUTPATIENT)
Age: 74
End: 2024-08-21

## 2024-10-29 ENCOUNTER — TRANSCRIPTION ENCOUNTER (OUTPATIENT)
Age: 74
End: 2024-10-29

## 2024-10-30 ENCOUNTER — TRANSCRIPTION ENCOUNTER (OUTPATIENT)
Age: 74
End: 2024-10-30

## 2024-11-04 ENCOUNTER — APPOINTMENT (OUTPATIENT)
Dept: INTERNAL MEDICINE | Facility: CLINIC | Age: 74
End: 2024-11-04
Payer: MEDICARE

## 2024-11-04 VITALS
TEMPERATURE: 97.7 F | DIASTOLIC BLOOD PRESSURE: 77 MMHG | HEIGHT: 60 IN | OXYGEN SATURATION: 97 % | HEART RATE: 91 BPM | SYSTOLIC BLOOD PRESSURE: 154 MMHG | BODY MASS INDEX: 36.12 KG/M2 | WEIGHT: 184 LBS

## 2024-11-04 VITALS — SYSTOLIC BLOOD PRESSURE: 140 MMHG | DIASTOLIC BLOOD PRESSURE: 90 MMHG

## 2024-11-04 DIAGNOSIS — R73.09 OTHER ABNORMAL GLUCOSE: ICD-10-CM

## 2024-11-04 DIAGNOSIS — Z00.00 ENCOUNTER FOR GENERAL ADULT MEDICAL EXAMINATION W/OUT ABNORMAL FINDINGS: ICD-10-CM

## 2024-11-04 DIAGNOSIS — I10 ESSENTIAL (PRIMARY) HYPERTENSION: ICD-10-CM

## 2024-11-04 DIAGNOSIS — E03.9 HYPOTHYROIDISM, UNSPECIFIED: ICD-10-CM

## 2024-11-04 DIAGNOSIS — K21.9 GASTRO-ESOPHAGEAL REFLUX DISEASE W/OUT ESOPHAGITIS: ICD-10-CM

## 2024-11-04 PROCEDURE — 36415 COLL VENOUS BLD VENIPUNCTURE: CPT

## 2024-11-04 PROCEDURE — G0439: CPT

## 2024-11-07 ENCOUNTER — RESULT REVIEW (OUTPATIENT)
Age: 74
End: 2024-11-07

## 2024-11-07 ENCOUNTER — APPOINTMENT (OUTPATIENT)
Dept: GASTROENTEROLOGY | Facility: HOSPITAL | Age: 74
End: 2024-11-07

## 2024-11-07 ENCOUNTER — OUTPATIENT (OUTPATIENT)
Dept: OUTPATIENT SERVICES | Facility: HOSPITAL | Age: 74
LOS: 1 days | Discharge: HOME | End: 2024-11-07
Payer: MEDICARE

## 2024-11-07 VITALS
HEART RATE: 74 BPM | RESPIRATION RATE: 15 BRPM | SYSTOLIC BLOOD PRESSURE: 122 MMHG | OXYGEN SATURATION: 97 % | DIASTOLIC BLOOD PRESSURE: 68 MMHG

## 2024-11-07 VITALS
RESPIRATION RATE: 15 BRPM | HEIGHT: 60 IN | SYSTOLIC BLOOD PRESSURE: 153 MMHG | OXYGEN SATURATION: 95 % | DIASTOLIC BLOOD PRESSURE: 74 MMHG | HEART RATE: 80 BPM | WEIGHT: 179.9 LBS | TEMPERATURE: 97 F

## 2024-11-07 DIAGNOSIS — K21.9 GASTRO-ESOPHAGEAL REFLUX DISEASE WITHOUT ESOPHAGITIS: ICD-10-CM

## 2024-11-07 DIAGNOSIS — Z98.89 OTHER SPECIFIED POSTPROCEDURAL STATES: Chronic | ICD-10-CM

## 2024-11-07 DIAGNOSIS — Z98.890 OTHER SPECIFIED POSTPROCEDURAL STATES: Chronic | ICD-10-CM

## 2024-11-07 DIAGNOSIS — Z96.642 PRESENCE OF LEFT ARTIFICIAL HIP JOINT: Chronic | ICD-10-CM

## 2024-11-07 PROCEDURE — 88305 TISSUE EXAM BY PATHOLOGIST: CPT | Mod: 26

## 2024-11-07 PROCEDURE — 43239 EGD BIOPSY SINGLE/MULTIPLE: CPT

## 2024-11-07 PROCEDURE — 45378 DIAGNOSTIC COLONOSCOPY: CPT

## 2024-11-07 RX ORDER — CEFAZOLIN SODIUM 1 G
2000 VIAL (EA) INJECTION ONCE
Refills: 0 | Status: COMPLETED | OUTPATIENT
Start: 2024-11-07 | End: 2024-11-07

## 2024-11-07 RX ADMIN — Medication 100 MILLIGRAM(S): at 08:43

## 2024-11-11 ENCOUNTER — APPOINTMENT (OUTPATIENT)
Dept: ULTRASOUND IMAGING | Facility: IMAGING CENTER | Age: 74
End: 2024-11-11
Payer: MEDICARE

## 2024-11-11 ENCOUNTER — RESULT REVIEW (OUTPATIENT)
Age: 74
End: 2024-11-11

## 2024-11-11 ENCOUNTER — OUTPATIENT (OUTPATIENT)
Dept: OUTPATIENT SERVICES | Facility: HOSPITAL | Age: 74
LOS: 1 days | End: 2024-11-11
Payer: MEDICARE

## 2024-11-11 ENCOUNTER — APPOINTMENT (OUTPATIENT)
Dept: MAMMOGRAPHY | Facility: IMAGING CENTER | Age: 74
End: 2024-11-11
Payer: MEDICARE

## 2024-11-11 DIAGNOSIS — Z00.00 ENCOUNTER FOR GENERAL ADULT MEDICAL EXAMINATION WITHOUT ABNORMAL FINDINGS: ICD-10-CM

## 2024-11-11 DIAGNOSIS — R92.30 DENSE BREASTS, UNSPECIFIED: ICD-10-CM

## 2024-11-11 DIAGNOSIS — Z98.890 OTHER SPECIFIED POSTPROCEDURAL STATES: Chronic | ICD-10-CM

## 2024-11-11 DIAGNOSIS — Z98.89 OTHER SPECIFIED POSTPROCEDURAL STATES: Chronic | ICD-10-CM

## 2024-11-11 DIAGNOSIS — Z96.642 PRESENCE OF LEFT ARTIFICIAL HIP JOINT: Chronic | ICD-10-CM

## 2024-11-11 LAB
25(OH)D3 SERPL-MCNC: 49.8 NG/ML
ALBUMIN SERPL ELPH-MCNC: 5 G/DL
ALP BLD-CCNC: 71 U/L
ALT SERPL-CCNC: 27 U/L
ANION GAP SERPL CALC-SCNC: 16 MMOL/L
AST SERPL-CCNC: 23 U/L
BILIRUB SERPL-MCNC: 0.7 MG/DL
BUN SERPL-MCNC: 14 MG/DL
CALCIUM SERPL-MCNC: 10.4 MG/DL
CHLORIDE SERPL-SCNC: 101 MMOL/L
CHOLEST SERPL-MCNC: 222 MG/DL
CO2 SERPL-SCNC: 25 MMOL/L
CREAT SERPL-MCNC: 0.7 MG/DL
EGFR: 92 ML/MIN/1.73M2
ESTIMATED AVERAGE GLUCOSE: 123 MG/DL
GLUCOSE SERPL-MCNC: 118 MG/DL
HBA1C MFR BLD HPLC: 5.9 %
HCT VFR BLD CALC: 45.7 %
HDLC SERPL-MCNC: 102 MG/DL
HGB BLD-MCNC: 14.8 G/DL
LDLC SERPL CALC-MCNC: 106 MG/DL
MCHC RBC-ENTMCNC: 28.3 PG
MCHC RBC-ENTMCNC: 32.4 G/DL
MCV RBC AUTO: 87.4 FL
NONHDLC SERPL-MCNC: 120 MG/DL
PLATELET # BLD AUTO: 344 K/UL
POTASSIUM SERPL-SCNC: 4.4 MMOL/L
PROT SERPL-MCNC: 7.4 G/DL
RBC # BLD: 5.23 M/UL
RBC # FLD: 14.1 %
SODIUM SERPL-SCNC: 141 MMOL/L
TRIGL SERPL-MCNC: 85 MG/DL
TSH SERPL-ACNC: 2.5 UIU/ML
WBC # FLD AUTO: 6.99 K/UL

## 2024-11-11 PROCEDURE — 77067 SCR MAMMO BI INCL CAD: CPT | Mod: 26

## 2024-11-11 PROCEDURE — 77063 BREAST TOMOSYNTHESIS BI: CPT | Mod: 26

## 2024-11-11 PROCEDURE — 76641 ULTRASOUND BREAST COMPLETE: CPT | Mod: 26,50,GA

## 2024-11-12 LAB — SURGICAL PATHOLOGY STUDY: SIGNIFICANT CHANGE UP

## 2024-11-17 PROBLEM — Z12.11 ENCOUNTER FOR SCREENING COLONOSCOPY: Status: ACTIVE | Noted: 2024-03-18

## 2024-11-20 PROCEDURE — 77063 BREAST TOMOSYNTHESIS BI: CPT

## 2024-11-20 PROCEDURE — 76641 ULTRASOUND BREAST COMPLETE: CPT

## 2024-11-20 PROCEDURE — 77067 SCR MAMMO BI INCL CAD: CPT

## 2025-03-24 ENCOUNTER — APPOINTMENT (OUTPATIENT)
Dept: GASTROENTEROLOGY | Facility: CLINIC | Age: 75
End: 2025-03-24
Payer: MEDICARE

## 2025-03-24 VITALS
WEIGHT: 187 LBS | SYSTOLIC BLOOD PRESSURE: 142 MMHG | OXYGEN SATURATION: 98 % | HEART RATE: 73 BPM | DIASTOLIC BLOOD PRESSURE: 68 MMHG | RESPIRATION RATE: 16 BRPM | BODY MASS INDEX: 35.3 KG/M2 | HEIGHT: 61 IN

## 2025-03-24 PROCEDURE — 99213 OFFICE O/P EST LOW 20 MIN: CPT

## 2025-05-12 ENCOUNTER — TRANSCRIPTION ENCOUNTER (OUTPATIENT)
Age: 75
End: 2025-05-12

## 2025-07-14 ENCOUNTER — TRANSCRIPTION ENCOUNTER (OUTPATIENT)
Age: 75
End: 2025-07-14

## 2025-08-14 ENCOUNTER — NON-APPOINTMENT (OUTPATIENT)
Age: 75
End: 2025-08-14

## 2025-08-14 ENCOUNTER — APPOINTMENT (OUTPATIENT)
Dept: OBGYN | Facility: CLINIC | Age: 75
End: 2025-08-14
Payer: MEDICARE

## 2025-08-14 VITALS
SYSTOLIC BLOOD PRESSURE: 156 MMHG | WEIGHT: 187 LBS | HEART RATE: 85 BPM | BODY MASS INDEX: 35.3 KG/M2 | HEIGHT: 61 IN | DIASTOLIC BLOOD PRESSURE: 81 MMHG

## 2025-08-14 VITALS — DIASTOLIC BLOOD PRESSURE: 83 MMHG | HEART RATE: 77 BPM | SYSTOLIC BLOOD PRESSURE: 154 MMHG

## 2025-08-14 DIAGNOSIS — Z01.419 ENCOUNTER FOR GYNECOLOGICAL EXAMINATION (GENERAL) (ROUTINE) W/OUT ABNORMAL FINDINGS: ICD-10-CM

## 2025-08-14 PROCEDURE — G0101: CPT

## 2025-08-14 RX ORDER — VITAMIN E ACID SUCCINATE 268 MG
TABLET ORAL
Refills: 0 | Status: ACTIVE | COMMUNITY

## 2025-08-14 RX ORDER — PSYLLIUM HUSK 0.4 G
CAPSULE ORAL
Refills: 0 | Status: ACTIVE | COMMUNITY

## 2025-08-14 RX ORDER — UBIDECARENONE 30 MG
100 CAPSULE ORAL
Refills: 0 | Status: ACTIVE | COMMUNITY

## 2025-08-14 RX ORDER — CALCIUM CARB, CITRATE/VIT D3 600MG-12.5
600-40-500 TABLET, EXTENDED RELEASE ORAL
Refills: 0 | Status: ACTIVE | COMMUNITY

## 2025-08-14 RX ORDER — VITAMIN A 10000 UNIT
250 TABLET ORAL
Refills: 0 | Status: ACTIVE | COMMUNITY

## 2025-08-14 RX ORDER — BERBERINE CHLOR/SEAWEED/CHROM 500-250 MG
500 CAPSULE ORAL
Refills: 0 | Status: ACTIVE | COMMUNITY

## 2025-08-14 RX ORDER — CHROMIUM 200 MCG
200 TABLET ORAL
Refills: 0 | Status: ACTIVE | COMMUNITY

## 2025-08-16 LAB — HPV HIGH+LOW RISK DNA PNL CVX: NOT DETECTED

## 2025-08-18 ENCOUNTER — TRANSCRIPTION ENCOUNTER (OUTPATIENT)
Age: 75
End: 2025-08-18

## 2025-08-18 DIAGNOSIS — D36.9 BENIGN NEOPLASM, UNSPECIFIED SITE: ICD-10-CM

## 2025-08-19 LAB — CYTOLOGY CVX/VAG DOC THIN PREP: NORMAL

## 2025-08-21 ENCOUNTER — TRANSCRIPTION ENCOUNTER (OUTPATIENT)
Age: 75
End: 2025-08-21

## 2025-08-21 RX ORDER — PANTOPRAZOLE 20 MG/1
20 TABLET, DELAYED RELEASE ORAL DAILY
Qty: 90 | Refills: 3 | Status: ACTIVE | COMMUNITY
Start: 2025-08-21 | End: 1900-01-01

## 2025-08-22 ENCOUNTER — TRANSCRIPTION ENCOUNTER (OUTPATIENT)
Age: 75
End: 2025-08-22

## (undated) DEVICE — CATH IV SAFE BC 22G X 1" (BLUE)

## (undated) DEVICE — CONTAINER FORMALIN 80ML YELLOW

## (undated) DEVICE — BITE BLOCK ADULT 20 X 27MM (GREEN)

## (undated) DEVICE — DRSG CURITY GAUZE SPONGE 4 X 4" 12-PLY NON-STERILE

## (undated) DEVICE — TUBING SUCTION NONCONDUCTIVE 6MM X 12FT

## (undated) DEVICE — DRSG BANDAID 0.75X3"

## (undated) DEVICE — TUBING MEDI-VAC W MAXIGRIP CONNECTORS 1/4"X6'

## (undated) DEVICE — DENTURE CUP PINK

## (undated) DEVICE — PACK IV START WITH CHG

## (undated) DEVICE — ELCTR ECG CONDUCTIVE ADHESIVE

## (undated) DEVICE — TUBING IV SET GRAVITY 3Y 100" MACRO

## (undated) DEVICE — ELCTR GROUNDING PAD ADULT COVIDIEN

## (undated) DEVICE — GOWN LG

## (undated) DEVICE — BIOPSY FORCEP RADIAL JAW 4 STANDARD WITH NEEDLE

## (undated) DEVICE — BASIN EMESIS 10IN GRADUATED MAUVE

## (undated) DEVICE — CLAMP BX HOT RAD JAW 3

## (undated) DEVICE — UNDERPAD LINEN SAVER 17 X 24"

## (undated) DEVICE — LUBRICATING JELLY HR ONE SHOT 3G

## (undated) DEVICE — CONTAINER FORMALIN 10% 20ML

## (undated) DEVICE — SALIVA EJECTOR (BLUE)

## (undated) DEVICE — DRSG 2X2

## (undated) DEVICE — BIOPSY FORCEP COLD DISP